# Patient Record
Sex: MALE | Race: WHITE | NOT HISPANIC OR LATINO | Employment: OTHER | URBAN - METROPOLITAN AREA
[De-identification: names, ages, dates, MRNs, and addresses within clinical notes are randomized per-mention and may not be internally consistent; named-entity substitution may affect disease eponyms.]

---

## 2017-01-12 ENCOUNTER — AMBULATORY - HEALTHEAST (OUTPATIENT)
Dept: INTERNAL MEDICINE | Facility: CLINIC | Age: 62
End: 2017-01-12

## 2017-01-12 DIAGNOSIS — Z82.49 FAMILY HISTORY OF MI (MYOCARDIAL INFARCTION): ICD-10-CM

## 2017-01-30 ENCOUNTER — AMBULATORY - HEALTHEAST (OUTPATIENT)
Dept: INTERNAL MEDICINE | Facility: CLINIC | Age: 62
End: 2017-01-30

## 2017-01-30 ENCOUNTER — HOSPITAL ENCOUNTER (OUTPATIENT)
Dept: CARDIOLOGY | Facility: CLINIC | Age: 62
Discharge: HOME | End: 2017-01-30
Attending: INTERNAL MEDICINE

## 2017-01-30 ENCOUNTER — OFFICE VISIT - HEALTHEAST (OUTPATIENT)
Dept: INTERNAL MEDICINE | Facility: CLINIC | Age: 62
End: 2017-01-30

## 2017-01-30 DIAGNOSIS — R94.39 ABNORMAL STRESS TEST: ICD-10-CM

## 2017-01-30 DIAGNOSIS — E11.9 TYPE 2 DIABETES MELLITUS (H): ICD-10-CM

## 2017-01-30 DIAGNOSIS — R79.89 OTHER SPECIFIED ABNORMAL FINDINGS OF BLOOD CHEMISTRY: ICD-10-CM

## 2017-01-30 DIAGNOSIS — Z82.49 FAMILY HISTORY OF MI (MYOCARDIAL INFARCTION): ICD-10-CM

## 2017-01-30 DIAGNOSIS — E78.00 PURE HYPERCHOLESTEROLEMIA: ICD-10-CM

## 2017-01-30 LAB
CV BLOOD PRESSURE: NORMAL MMHG
CV STRESS CURRENT BP HE: NORMAL
CV STRESS CURRENT HR HE: 115
CV STRESS CURRENT HR HE: 116
CV STRESS CURRENT HR HE: 119
CV STRESS CURRENT HR HE: 121
CV STRESS CURRENT HR HE: 121
CV STRESS CURRENT HR HE: 131
CV STRESS CURRENT HR HE: 133
CV STRESS CURRENT HR HE: 134
CV STRESS CURRENT HR HE: 138
CV STRESS CURRENT HR HE: 138
CV STRESS CURRENT HR HE: 140
CV STRESS CURRENT HR HE: 144
CV STRESS CURRENT HR HE: 145
CV STRESS CURRENT HR HE: 57
CV STRESS CURRENT HR HE: 66
CV STRESS CURRENT HR HE: 76
CV STRESS CURRENT HR HE: 77
CV STRESS CURRENT HR HE: 81
CV STRESS CURRENT HR HE: 83
CV STRESS CURRENT HR HE: 92
CV STRESS CURRENT HR HE: 93
CV STRESS CURRENT HR HE: 93
CV STRESS CURRENT HR HE: 95
CV STRESS DEVIATION TIME HE: NORMAL
CV STRESS EXERCISE STAGE HE: NORMAL
CV STRESS FINAL RESTING BP HE: NORMAL
CV STRESS FINAL RESTING HR HE: 81
CV STRESS MAX HR HE: 144
CV STRESS MAX TREADMILL GRADE HE: 14
CV STRESS MAX TREADMILL SPEED HE: 3.4
CV STRESS PEAK DIA BP HE: NORMAL
CV STRESS PEAK SYS BP HE: NORMAL
CV STRESS PHASE HE: NORMAL
CV STRESS PROTOCOL HE: NORMAL
CV STRESS RESTING PT POSITION HE: NORMAL
CV STRESS RESTING PT POSITION HE: NORMAL
CV STRESS ST DEVIATION AMOUNT HE: NORMAL
CV STRESS ST DEVIATION ELEVATION HE: NORMAL
CV STRESS ST EVELATION AMOUNT HE: NORMAL
CV STRESS TEST TYPE HE: NORMAL
CV STRESS TOTAL STAGE TIME MIN 1 HE: NORMAL
ECHO EJECTION FRACTION ESTIMATED: 65 %
HBA1C MFR BLD: 5.8 % (ref 3.5–6)
LEFT VENTRICLE HEART RATE: 72 BPM
STRESS ECHO BASELINE BP: NORMAL
STRESS ECHO BASELINE HR: 57
STRESS ECHO CALCULATED PERCENT HR: 91
STRESS ECHO LAST STRESS BP: NORMAL
STRESS ECHO LAST STRESS HR: 144
STRESS ECHO POST ESTIMATED WORKLOAD: 7.3
STRESS ECHO POST EXERCISE DUR MIN: 6
STRESS ECHO POST EXERCISE DUR SEC: 0
STRESS ECHO TARGET HR: 158

## 2017-01-31 ENCOUNTER — AMBULATORY - HEALTHEAST (OUTPATIENT)
Dept: INTERNAL MEDICINE | Facility: CLINIC | Age: 62
End: 2017-01-31

## 2017-01-31 DIAGNOSIS — N28.9 ACUTE RENAL INSUFFICIENCY: ICD-10-CM

## 2017-02-02 ENCOUNTER — COMMUNICATION - HEALTHEAST (OUTPATIENT)
Dept: CARDIOLOGY | Facility: CLINIC | Age: 62
End: 2017-02-02

## 2017-02-03 ENCOUNTER — COMMUNICATION - HEALTHEAST (OUTPATIENT)
Dept: INTERNAL MEDICINE | Facility: CLINIC | Age: 62
End: 2017-02-03

## 2017-02-03 ENCOUNTER — AMBULATORY - HEALTHEAST (OUTPATIENT)
Dept: LAB | Facility: CLINIC | Age: 62
End: 2017-02-03

## 2017-02-03 DIAGNOSIS — N28.9 ACUTE RENAL INSUFFICIENCY: ICD-10-CM

## 2017-02-06 ENCOUNTER — COMMUNICATION - HEALTHEAST (OUTPATIENT)
Dept: INTERNAL MEDICINE | Facility: CLINIC | Age: 62
End: 2017-02-06

## 2017-02-06 ENCOUNTER — OFFICE VISIT - HEALTHEAST (OUTPATIENT)
Dept: INTERNAL MEDICINE | Facility: CLINIC | Age: 62
End: 2017-02-06

## 2017-02-06 DIAGNOSIS — N28.9 ACUTE RENAL INSUFFICIENCY: ICD-10-CM

## 2017-02-06 DIAGNOSIS — E11.9 TYPE 2 DIABETES MELLITUS (H): ICD-10-CM

## 2017-02-07 ENCOUNTER — HOSPITAL ENCOUNTER (OUTPATIENT)
Dept: CT IMAGING | Facility: CLINIC | Age: 62
Discharge: HOME | End: 2017-02-07
Attending: INTERNAL MEDICINE

## 2017-02-08 ENCOUNTER — HOSPITAL ENCOUNTER (OUTPATIENT)
Dept: ULTRASOUND IMAGING | Facility: CLINIC | Age: 62
Discharge: HOME | End: 2017-02-08
Attending: INTERNAL MEDICINE

## 2017-02-08 DIAGNOSIS — N28.9 ACUTE RENAL INSUFFICIENCY: ICD-10-CM

## 2017-02-21 ENCOUNTER — COMMUNICATION - HEALTHEAST (OUTPATIENT)
Dept: ADMINISTRATIVE | Facility: CLINIC | Age: 62
End: 2017-02-21

## 2017-02-22 ENCOUNTER — AMBULATORY - HEALTHEAST (OUTPATIENT)
Dept: INTERNAL MEDICINE | Facility: CLINIC | Age: 62
End: 2017-02-22

## 2017-02-22 DIAGNOSIS — N28.9 ACUTE RENAL INSUFFICIENCY: ICD-10-CM

## 2017-02-27 ENCOUNTER — AMBULATORY - HEALTHEAST (OUTPATIENT)
Dept: LAB | Facility: CLINIC | Age: 62
End: 2017-02-27

## 2017-02-27 DIAGNOSIS — N28.9 ACUTE RENAL INSUFFICIENCY: ICD-10-CM

## 2017-03-02 ENCOUNTER — HOSPITAL ENCOUNTER (OUTPATIENT)
Dept: CT IMAGING | Facility: CLINIC | Age: 62
Discharge: HOME | End: 2017-03-02
Attending: INTERNAL MEDICINE

## 2017-03-16 ENCOUNTER — AMBULATORY - HEALTHEAST (OUTPATIENT)
Dept: LAB | Facility: CLINIC | Age: 62
End: 2017-03-16

## 2017-03-16 ENCOUNTER — AMBULATORY - HEALTHEAST (OUTPATIENT)
Dept: INTERNAL MEDICINE | Facility: CLINIC | Age: 62
End: 2017-03-16

## 2017-03-16 DIAGNOSIS — N28.9 MILD RENAL INSUFFICIENCY: ICD-10-CM

## 2017-03-20 ENCOUNTER — AMBULATORY - HEALTHEAST (OUTPATIENT)
Dept: INTERNAL MEDICINE | Facility: CLINIC | Age: 62
End: 2017-03-20

## 2017-03-20 DIAGNOSIS — N28.9 RENAL INSUFFICIENCY: ICD-10-CM

## 2017-03-30 ENCOUNTER — AMBULATORY - HEALTHEAST (OUTPATIENT)
Dept: INTERNAL MEDICINE | Facility: CLINIC | Age: 62
End: 2017-03-30

## 2017-03-30 ENCOUNTER — AMBULATORY - HEALTHEAST (OUTPATIENT)
Dept: LAB | Facility: CLINIC | Age: 62
End: 2017-03-30

## 2017-03-30 DIAGNOSIS — N28.9 RENAL INSUFFICIENCY: ICD-10-CM

## 2017-04-03 LAB
ALBUMIN PERCENT: 62.9 % (ref 51–67)
ALBUMIN SERPL ELPH-MCNC: 4.3 G/DL (ref 3.2–4.7)
ALPHA 1 PERCENT: 2.8 % (ref 2–4)
ALPHA 2 PERCENT: 13.1 % (ref 5–13)
ALPHA1 GLOB SERPL ELPH-MCNC: 0.2 G/DL (ref 0.1–0.3)
ALPHA2 GLOB SERPL ELPH-MCNC: 0.9 G/DL (ref 0.4–0.9)
B-GLOBULIN SERPL ELPH-MCNC: 0.9 G/DL (ref 0.7–1.2)
BETA PERCENT: 12.5 % (ref 10–17)
GAMMA GLOB SERPL ELPH-MCNC: 0.6 G/DL (ref 0.6–1.4)
GAMMA GLOBULIN PERCENT: 8.7 % (ref 9–20)
PATH ICD:: ABNORMAL
PROT PATTERN SERPL ELPH-IMP: ABNORMAL
PROT SERPL-MCNC: 6.9 G/DL (ref 6–8)
REVIEWING PATHOLOGIST: ABNORMAL

## 2017-04-04 LAB
PATH ICD:: NORMAL
PROT ELPH PNL UR ELPH: NORMAL
REVIEWING PATHOLOGIST: NORMAL

## 2017-04-05 ENCOUNTER — RECORDS - HEALTHEAST (OUTPATIENT)
Dept: ADMINISTRATIVE | Facility: OTHER | Age: 62
End: 2017-04-05

## 2017-04-06 ENCOUNTER — AMBULATORY - HEALTHEAST (OUTPATIENT)
Dept: LAB | Facility: CLINIC | Age: 62
End: 2017-04-06

## 2017-04-06 DIAGNOSIS — N28.9 RENAL INSUFFICIENCY: ICD-10-CM

## 2017-05-08 ENCOUNTER — SURGERY - HEALTHEAST (OUTPATIENT)
Dept: CARDIOLOGY | Facility: CLINIC | Age: 62
End: 2017-05-08

## 2017-05-08 ENCOUNTER — AMBULATORY - HEALTHEAST (OUTPATIENT)
Dept: CARDIOLOGY | Facility: CLINIC | Age: 62
End: 2017-05-08

## 2017-05-08 ENCOUNTER — OFFICE VISIT - HEALTHEAST (OUTPATIENT)
Dept: CARDIOLOGY | Facility: CLINIC | Age: 62
End: 2017-05-08

## 2017-05-08 DIAGNOSIS — R94.39 ABNORMAL STRESS ELECTROCARDIOGRAM TEST: ICD-10-CM

## 2017-05-08 DIAGNOSIS — R80.9 PROTEINURIA: ICD-10-CM

## 2017-05-08 DIAGNOSIS — E78.00 PURE HYPERCHOLESTEROLEMIA: ICD-10-CM

## 2017-05-08 ASSESSMENT — MIFFLIN-ST. JEOR: SCORE: 1704.33

## 2017-05-22 ENCOUNTER — SURGERY - HEALTHEAST (OUTPATIENT)
Dept: CARDIOLOGY | Facility: CLINIC | Age: 62
End: 2017-05-22

## 2017-05-22 ENCOUNTER — COMMUNICATION - HEALTHEAST (OUTPATIENT)
Dept: INTERNAL MEDICINE | Facility: CLINIC | Age: 62
End: 2017-05-22

## 2017-05-22 ASSESSMENT — MIFFLIN-ST. JEOR: SCORE: 1741.52

## 2017-05-25 ENCOUNTER — OFFICE VISIT - HEALTHEAST (OUTPATIENT)
Dept: INTERNAL MEDICINE | Facility: CLINIC | Age: 62
End: 2017-05-25

## 2017-05-25 DIAGNOSIS — E11.9 TYPE 2 DIABETES MELLITUS (H): ICD-10-CM

## 2017-05-25 DIAGNOSIS — R79.89 OTHER SPECIFIED ABNORMAL FINDINGS OF BLOOD CHEMISTRY: ICD-10-CM

## 2017-05-25 DIAGNOSIS — R63.5 WEIGHT GAIN, ABNORMAL: ICD-10-CM

## 2017-05-25 DIAGNOSIS — N18.9 CHRONIC RENAL INSUFFICIENCY: ICD-10-CM

## 2017-05-25 DIAGNOSIS — D64.9 ANEMIA: ICD-10-CM

## 2017-05-25 LAB — HBA1C MFR BLD: 6.5 % (ref 3.5–6)

## 2017-06-01 ENCOUNTER — AMBULATORY - HEALTHEAST (OUTPATIENT)
Dept: INTERNAL MEDICINE | Facility: CLINIC | Age: 62
End: 2017-06-01

## 2017-06-01 DIAGNOSIS — M79.604 RIGHT LEG PAIN: ICD-10-CM

## 2017-06-01 DIAGNOSIS — M54.10 RADICULAR LEG PAIN: ICD-10-CM

## 2017-06-03 ENCOUNTER — HOSPITAL ENCOUNTER (OUTPATIENT)
Dept: MRI IMAGING | Facility: CLINIC | Age: 62
Discharge: HOME | End: 2017-06-03
Attending: INTERNAL MEDICINE

## 2017-06-03 DIAGNOSIS — M54.10 RADICULAR LEG PAIN: ICD-10-CM

## 2017-06-03 DIAGNOSIS — M79.604 RIGHT LEG PAIN: ICD-10-CM

## 2017-06-08 ENCOUNTER — OFFICE VISIT - HEALTHEAST (OUTPATIENT)
Dept: INTERNAL MEDICINE | Facility: CLINIC | Age: 62
End: 2017-06-08

## 2017-06-08 DIAGNOSIS — M79.604 RIGHT LEG PAIN: ICD-10-CM

## 2017-07-17 ENCOUNTER — AMBULATORY - HEALTHEAST (OUTPATIENT)
Dept: INTERNAL MEDICINE | Facility: CLINIC | Age: 62
End: 2017-07-17

## 2017-07-17 RX ORDER — SILDENAFIL CITRATE 20 MG/1
TABLET ORAL
Qty: 90 TABLET | Refills: 3 | Status: SHIPPED | OUTPATIENT
Start: 2017-07-17 | End: 2024-05-16

## 2017-08-14 ENCOUNTER — AMBULATORY - HEALTHEAST (OUTPATIENT)
Dept: INTERNAL MEDICINE | Facility: CLINIC | Age: 62
End: 2017-08-14

## 2017-09-07 ENCOUNTER — OFFICE VISIT - HEALTHEAST (OUTPATIENT)
Dept: INTERNAL MEDICINE | Facility: CLINIC | Age: 62
End: 2017-09-07

## 2017-09-07 ENCOUNTER — AMBULATORY - HEALTHEAST (OUTPATIENT)
Dept: INTERNAL MEDICINE | Facility: CLINIC | Age: 62
End: 2017-09-07

## 2017-09-07 DIAGNOSIS — M17.10 KNEE ARTHROPATHY: ICD-10-CM

## 2017-09-07 DIAGNOSIS — R79.89 OTHER SPECIFIED ABNORMAL FINDINGS OF BLOOD CHEMISTRY: ICD-10-CM

## 2017-09-07 DIAGNOSIS — E11.9 TYPE 2 DIABETES MELLITUS (H): ICD-10-CM

## 2017-09-07 DIAGNOSIS — D64.9 CHRONIC ANEMIA: ICD-10-CM

## 2017-09-07 DIAGNOSIS — N18.9 CHRONIC RENAL INSUFFICIENCY: ICD-10-CM

## 2017-09-07 LAB — HBA1C MFR BLD: 5.7 % (ref 3.5–6)

## 2017-09-21 ENCOUNTER — AMBULATORY - HEALTHEAST (OUTPATIENT)
Dept: INTERNAL MEDICINE | Facility: CLINIC | Age: 62
End: 2017-09-21

## 2017-10-17 ENCOUNTER — COMMUNICATION - HEALTHEAST (OUTPATIENT)
Dept: INTERNAL MEDICINE | Facility: CLINIC | Age: 62
End: 2017-10-17

## 2017-10-17 DIAGNOSIS — E11.9 TYPE 2 DIABETES MELLITUS (H): ICD-10-CM

## 2017-11-06 ENCOUNTER — COMMUNICATION - HEALTHEAST (OUTPATIENT)
Dept: INTERNAL MEDICINE | Facility: CLINIC | Age: 62
End: 2017-11-06

## 2017-11-29 ENCOUNTER — OFFICE VISIT - HEALTHEAST (OUTPATIENT)
Dept: INTERNAL MEDICINE | Facility: CLINIC | Age: 62
End: 2017-11-29

## 2017-11-29 DIAGNOSIS — D64.9 CHRONIC ANEMIA: ICD-10-CM

## 2017-11-29 DIAGNOSIS — N18.9 CHRONIC RENAL INSUFFICIENCY: ICD-10-CM

## 2017-11-29 DIAGNOSIS — M76.30 ITB SYNDROME: ICD-10-CM

## 2017-11-29 DIAGNOSIS — R79.89 OTHER SPECIFIED ABNORMAL FINDINGS OF BLOOD CHEMISTRY: ICD-10-CM

## 2017-11-29 DIAGNOSIS — E11.9 TYPE 2 DIABETES MELLITUS (H): ICD-10-CM

## 2017-11-29 LAB
CHOLEST SERPL-MCNC: 196 MG/DL
FASTING STATUS PATIENT QL REPORTED: ABNORMAL
HBA1C MFR BLD: 6 % (ref 3.5–6)
HDLC SERPL-MCNC: 49 MG/DL
LDLC SERPL CALC-MCNC: 100 MG/DL
TRIGL SERPL-MCNC: 234 MG/DL

## 2017-11-29 RX ORDER — HYDROMORPHONE HYDROCHLORIDE 2 MG/1
TABLET ORAL
Qty: 20 TABLET | Refills: 0 | Status: SHIPPED | OUTPATIENT
Start: 2017-11-29 | End: 2024-05-16

## 2017-11-29 RX ORDER — TRAMADOL HYDROCHLORIDE 50 MG/1
TABLET ORAL
Qty: 90 TABLET | Refills: 0 | Status: SHIPPED | OUTPATIENT
Start: 2017-11-29 | End: 2024-05-16

## 2017-11-30 ENCOUNTER — AMBULATORY - HEALTHEAST (OUTPATIENT)
Dept: INTERNAL MEDICINE | Facility: CLINIC | Age: 62
End: 2017-11-30

## 2017-11-30 DIAGNOSIS — D50.9 IDA (IRON DEFICIENCY ANEMIA): ICD-10-CM

## 2018-01-10 ENCOUNTER — RECORDS - HEALTHEAST (OUTPATIENT)
Dept: ADMINISTRATIVE | Facility: OTHER | Age: 63
End: 2018-01-10

## 2018-01-11 ENCOUNTER — RECORDS - HEALTHEAST (OUTPATIENT)
Dept: ADMINISTRATIVE | Facility: OTHER | Age: 63
End: 2018-01-11

## 2018-04-23 ENCOUNTER — COMMUNICATION - HEALTHEAST (OUTPATIENT)
Dept: ADMINISTRATIVE | Facility: CLINIC | Age: 63
End: 2018-04-23

## 2018-04-25 ENCOUNTER — COMMUNICATION - HEALTHEAST (OUTPATIENT)
Dept: INTERNAL MEDICINE | Facility: CLINIC | Age: 63
End: 2018-04-25

## 2018-04-25 DIAGNOSIS — E11.9 TYPE 2 DIABETES MELLITUS (H): ICD-10-CM

## 2018-04-25 RX ORDER — LIRAGLUTIDE 6 MG/ML
INJECTION SUBCUTANEOUS
Qty: 6 ML | Refills: 0 | Status: SHIPPED | OUTPATIENT
Start: 2018-04-25 | End: 2024-05-13

## 2018-05-09 ENCOUNTER — OFFICE VISIT - HEALTHEAST (OUTPATIENT)
Dept: INTERNAL MEDICINE | Facility: CLINIC | Age: 63
End: 2018-05-09

## 2018-05-09 DIAGNOSIS — E11.9 TYPE 2 DIABETES MELLITUS (H): ICD-10-CM

## 2018-05-09 DIAGNOSIS — N18.9 CHRONIC RENAL INSUFFICIENCY: ICD-10-CM

## 2018-05-09 DIAGNOSIS — D64.9 CHRONIC ANEMIA: ICD-10-CM

## 2018-05-09 DIAGNOSIS — M12.9 ARTHROPATHY: ICD-10-CM

## 2018-05-09 DIAGNOSIS — Z12.5 SPECIAL SCREENING FOR MALIGNANT NEOPLASM OF PROSTATE: ICD-10-CM

## 2018-05-09 DIAGNOSIS — Z01.818 PREOP EXAM FOR INTERNAL MEDICINE: ICD-10-CM

## 2018-05-09 LAB
ALBUMIN SERPL-MCNC: 3.7 G/DL (ref 3.5–5)
ALBUMIN UR-MCNC: NEGATIVE MG/DL
ALP SERPL-CCNC: 78 U/L (ref 45–120)
ALT SERPL W P-5'-P-CCNC: 27 U/L (ref 0–45)
ANION GAP SERPL CALCULATED.3IONS-SCNC: 11 MMOL/L (ref 5–18)
APPEARANCE UR: CLEAR
AST SERPL W P-5'-P-CCNC: 22 U/L (ref 0–40)
BILIRUB SERPL-MCNC: 0.6 MG/DL (ref 0–1)
BILIRUB UR QL STRIP: NEGATIVE
BUN SERPL-MCNC: 36 MG/DL (ref 8–22)
CALCIUM SERPL-MCNC: 9.6 MG/DL (ref 8.5–10.5)
CHLORIDE BLD-SCNC: 103 MMOL/L (ref 98–107)
CHOLEST SERPL-MCNC: 199 MG/DL
CO2 SERPL-SCNC: 26 MMOL/L (ref 22–31)
COLOR UR AUTO: YELLOW
CREAT SERPL-MCNC: 1.83 MG/DL (ref 0.7–1.3)
ERYTHROCYTE [DISTWIDTH] IN BLOOD BY AUTOMATED COUNT: 12.2 % (ref 11–14.5)
FASTING STATUS PATIENT QL REPORTED: YES
GFR SERPL CREATININE-BSD FRML MDRD: 38 ML/MIN/1.73M2
GLUCOSE BLD-MCNC: 128 MG/DL (ref 70–125)
GLUCOSE UR STRIP-MCNC: NEGATIVE MG/DL
HBA1C MFR BLD: 6.6 % (ref 3.5–6)
HCT VFR BLD AUTO: 37.2 % (ref 40–54)
HDLC SERPL-MCNC: 43 MG/DL
HGB BLD-MCNC: 12.8 G/DL (ref 14–18)
HGB UR QL STRIP: NEGATIVE
IRON SATN MFR SERPL: 32 % (ref 20–50)
IRON SERPL-MCNC: 107 UG/DL (ref 42–175)
KETONES UR STRIP-MCNC: NEGATIVE MG/DL
LDLC SERPL CALC-MCNC: 122 MG/DL
LEUKOCYTE ESTERASE UR QL STRIP: NEGATIVE
MCH RBC QN AUTO: 29.5 PG (ref 27–34)
MCHC RBC AUTO-ENTMCNC: 34.4 G/DL (ref 32–36)
MCV RBC AUTO: 86 FL (ref 80–100)
NITRATE UR QL: NEGATIVE
PH UR STRIP: 5 [PH] (ref 5–8)
PLATELET # BLD AUTO: 162 THOU/UL (ref 140–440)
PMV BLD AUTO: 7.7 FL (ref 7–10)
POTASSIUM BLD-SCNC: 3.9 MMOL/L (ref 3.5–5)
PROT SERPL-MCNC: 7.1 G/DL (ref 6–8)
PSA SERPL-MCNC: 0.9 NG/ML (ref 0–4.5)
RBC # BLD AUTO: 4.33 MILL/UL (ref 4.4–6.2)
SODIUM SERPL-SCNC: 140 MMOL/L (ref 136–145)
SP GR UR STRIP: 1.02 (ref 1–1.03)
TIBC SERPL-MCNC: 332 UG/DL (ref 313–563)
TRANSFERRIN SERPL-MCNC: 266 MG/DL (ref 212–360)
TRIGL SERPL-MCNC: 168 MG/DL
UROBILINOGEN UR STRIP-ACNC: NORMAL
WBC: 5.7 THOU/UL (ref 4–11)

## 2018-05-09 RX ORDER — ATORVASTATIN CALCIUM 80 MG/1
80 TABLET, FILM COATED ORAL AT BEDTIME
Qty: 30 TABLET | Refills: 11 | Status: SHIPPED | OUTPATIENT
Start: 2018-05-09 | End: 2024-05-13

## 2018-05-09 ASSESSMENT — MIFFLIN-ST. JEOR: SCORE: 1699.34

## 2018-05-10 LAB — B BURGDOR IGG+IGM SER QL: 0.09 INDEX VALUE

## 2018-05-23 ENCOUNTER — COMMUNICATION - HEALTHEAST (OUTPATIENT)
Dept: ADMINISTRATIVE | Facility: CLINIC | Age: 63
End: 2018-05-23

## 2018-05-24 ENCOUNTER — RECORDS - HEALTHEAST (OUTPATIENT)
Dept: ADMINISTRATIVE | Facility: OTHER | Age: 63
End: 2018-05-24

## 2018-07-09 ENCOUNTER — COMMUNICATION - HEALTHEAST (OUTPATIENT)
Dept: INTERNAL MEDICINE | Facility: CLINIC | Age: 63
End: 2018-07-09

## 2018-07-11 RX ORDER — INSULIN GLARGINE 100 [IU]/ML
INJECTION, SOLUTION SUBCUTANEOUS
Qty: 15 PEN | Refills: 0 | Status: SHIPPED | OUTPATIENT
Start: 2018-07-11

## 2018-07-11 RX ORDER — LIRAGLUTIDE 6 MG/ML
INJECTION SUBCUTANEOUS
Qty: 6 ML | Refills: 0 | Status: SHIPPED | OUTPATIENT
Start: 2018-07-11 | End: 2024-05-16

## 2018-07-30 ENCOUNTER — TRANSFERRED RECORDS (OUTPATIENT)
Dept: HEALTH INFORMATION MANAGEMENT | Facility: CLINIC | Age: 63
End: 2018-07-30

## 2018-08-13 ENCOUNTER — COMMUNICATION - HEALTHEAST (OUTPATIENT)
Dept: INTERNAL MEDICINE | Facility: CLINIC | Age: 63
End: 2018-08-13

## 2018-08-30 ENCOUNTER — RECORDS - HEALTHEAST (OUTPATIENT)
Dept: ADMINISTRATIVE | Facility: OTHER | Age: 63
End: 2018-08-30

## 2018-10-11 ENCOUNTER — COMMUNICATION - HEALTHEAST (OUTPATIENT)
Dept: INTERNAL MEDICINE | Facility: CLINIC | Age: 63
End: 2018-10-11

## 2018-10-11 ENCOUNTER — RECORDS - HEALTHEAST (OUTPATIENT)
Dept: ADMINISTRATIVE | Facility: OTHER | Age: 63
End: 2018-10-11

## 2018-10-12 ENCOUNTER — COMMUNICATION - HEALTHEAST (OUTPATIENT)
Dept: INTERNAL MEDICINE | Facility: CLINIC | Age: 63
End: 2018-10-12

## 2018-10-12 RX ORDER — PIOGLITAZONEHYDROCHLORIDE 30 MG/1
TABLET ORAL
Qty: 90 TABLET | Refills: 0 | Status: SHIPPED | OUTPATIENT
Start: 2018-10-12 | End: 2024-05-16

## 2018-10-12 RX ORDER — CHLORTHALIDONE 25 MG/1
TABLET ORAL
Qty: 45 TABLET | Refills: 0 | Status: SHIPPED | OUTPATIENT
Start: 2018-10-12 | End: 2024-05-13

## 2018-12-20 ENCOUNTER — COMMUNICATION - HEALTHEAST (OUTPATIENT)
Dept: INTERNAL MEDICINE | Facility: CLINIC | Age: 63
End: 2018-12-20

## 2018-12-20 DIAGNOSIS — E11.9 TYPE 2 DIABETES MELLITUS (H): ICD-10-CM

## 2018-12-24 RX ORDER — CHLORTHALIDONE 25 MG/1
TABLET ORAL
Qty: 45 TABLET | Refills: 0 | Status: SHIPPED | OUTPATIENT
Start: 2018-12-24 | End: 2024-05-16

## 2018-12-24 RX ORDER — TAMSULOSIN HYDROCHLORIDE 0.4 MG/1
CAPSULE ORAL
Qty: 90 CAPSULE | Refills: 0 | Status: SHIPPED | OUTPATIENT
Start: 2018-12-24

## 2019-02-12 ENCOUNTER — COMMUNICATION - HEALTHEAST (OUTPATIENT)
Dept: CARDIOLOGY | Facility: CLINIC | Age: 64
End: 2019-02-12

## 2019-02-12 ENCOUNTER — COMMUNICATION - HEALTHEAST (OUTPATIENT)
Dept: INTERNAL MEDICINE | Facility: CLINIC | Age: 64
End: 2019-02-12

## 2019-02-12 DIAGNOSIS — E11.9 TYPE 2 DIABETES MELLITUS (H): ICD-10-CM

## 2019-02-12 DIAGNOSIS — R94.39 ABNORMAL STRESS ELECTROCARDIOGRAM TEST: ICD-10-CM

## 2019-04-23 ENCOUNTER — COMMUNICATION - HEALTHEAST (OUTPATIENT)
Dept: INTERNAL MEDICINE | Facility: CLINIC | Age: 64
End: 2019-04-23

## 2019-04-24 ENCOUNTER — COMMUNICATION - HEALTHEAST (OUTPATIENT)
Dept: INTERNAL MEDICINE | Facility: CLINIC | Age: 64
End: 2019-04-24

## 2020-02-14 ENCOUNTER — RECORDS - HEALTHEAST (OUTPATIENT)
Dept: ADMINISTRATIVE | Facility: OTHER | Age: 65
End: 2020-02-14

## 2021-05-26 ENCOUNTER — RECORDS - HEALTHEAST (OUTPATIENT)
Dept: ADMINISTRATIVE | Facility: CLINIC | Age: 66
End: 2021-05-26

## 2021-05-28 ENCOUNTER — RECORDS - HEALTHEAST (OUTPATIENT)
Dept: ADMINISTRATIVE | Facility: CLINIC | Age: 66
End: 2021-05-28

## 2021-05-28 NOTE — TELEPHONE ENCOUNTER
Left voicemail for patient to return call to clinic. When patient returns call, please give them below message.    Patient has not been seen in one year. He will need to establish care with a new physician now that Dr. Mack is no longer her. Please help schedule. Offer other locations as well. We may be able to get him a bridged refill once scheduled.    Lani Teresa CMA ............... 10:18 AM, 04/30/19

## 2021-05-28 NOTE — TELEPHONE ENCOUNTER
Former patient of Martina & has not established care with another provider.  Please assign refill request to covering provider per Clinic standard process.      Refill Approved    Rx renewed per Medication Renewal Policy. Medication was last renewed on 12/24/18.    Hortencia Mason, [facility] Triage/Med Refill 4/25/2019     Requested Prescriptions   Pending Prescriptions Disp Refills     chlorthalidone (HYGROTEN) 25 MG tablet [Pharmacy Med Name: CHLORTHALIDONE 25MG TABLETS] 45 tablet 0     Sig: TAKE 1/2 TABLET(12.5 MG) BY MOUTH DAILY       Diuretics/Combination Diuretics Refill Protocol  Passed - 4/23/2019  9:10 AM        Passed - Visit with PCP or prescribing provider visit in past 12 months     Last office visit with prescriber/PCP: Visit date not found OR same dept: Visit date not found OR same specialty: 11/29/2017 Fahad Mack MD  Last physical: Visit date not found Last MTM visit: Visit date not found   Next visit within 3 mo: Visit date not found  Next physical within 3 mo: Visit date not found  Prescriber OR PCP: John Torres MD  Last diagnosis associated with med order: There are no diagnoses linked to this encounter.  If protocol passes may refill for 12 months if within 3 months of last provider visit (or a total of 15 months).             Passed - Serum Potassium in past 12 months      Lab Results   Component Value Date    Potassium 3.9 05/09/2018             Passed - Serum Sodium in past 12 months      Lab Results   Component Value Date    Sodium 140 05/09/2018             Passed - Blood pressure on file in past 12 months     BP Readings from Last 1 Encounters:   05/09/18 138/70             Passed - Serum Creatinine in past 12 months      Creatinine   Date Value Ref Range Status   05/09/2018 1.83 (H) 0.70 - 1.30 mg/dL Final             tamsulosin (FLOMAX) 0.4 mg cap [Pharmacy Med Name: TAMSULOSIN 0.4MG CAPSULES] 90 capsule 0     Sig: TAKE 1 CAPSULE BY MOUTH DAILY AT BEDTIME        Alfuzosin/Tamsulosin/Silodosin Refill Protocol  Passed - 4/23/2019  9:10 AM        Passed - PCP or prescribing provider visit in past 12 months       Last office visit with prescriber/PCP: Visit date not found OR same dept: Visit date not found OR same specialty: 11/29/2017 Fahad Mack MD  Last physical: Visit date not found Last MTM visit: Visit date not found   Next visit within 3 mo: Visit date not found  Next physical within 3 mo: Visit date not found  Prescriber OR PCP: John Torres MD  Last diagnosis associated with med order: There are no diagnoses linked to this encounter.  If protocol passes may refill for 12 months if within 3 months of last provider visit (or a total of 15 months).

## 2021-05-28 NOTE — TELEPHONE ENCOUNTER
Left voicemail for patient to return call to clinic. When patient returns call, please give them below message.    Patient has not been seen in one year and has not established care with someone new. He will need to schedule an appointment.  Lani Teresa CMA ............... 10:23 AM, 04/26/19

## 2021-05-28 NOTE — TELEPHONE ENCOUNTER
Spoke with patient and he stayed with Dr. Mack. Disregard refills.  Lani Teresa CMA ............... 10:36 AM, 05/06/19

## 2021-05-28 NOTE — TELEPHONE ENCOUNTER
Former patient of Martina & has not established care with another provider.  Please assign refill request to covering provider per Clinic standard process.      Refill Approved    Rx renewed per Medication Renewal Policy. Medication was last renewed on 12/24/18.    Hortencia Mason, [facility] Triage/Med Refill 4/26/2019     Requested Prescriptions   Pending Prescriptions Disp Refills     tamsulosin (FLOMAX) 0.4 mg cap [Pharmacy Med Name: TAMSULOSIN 0.4MG CAPSULES] 60 capsule 0     Sig: TAKE 1 CAPSULE BY MOUTH DAILY AT BEDTIME       Alfuzosin/Tamsulosin/Silodosin Refill Protocol  Passed - 4/24/2019  8:38 PM        Passed - PCP or prescribing provider visit in past 12 months       Last office visit with prescriber/PCP: 11/29/2017 Fahad Mack MD OR gianluca dept: Visit date not found OR same specialty: 11/29/2017 Fahad Mack MD  Last physical: 5/9/2018 Last MTM visit: Visit date not found   Next visit within 3 mo: Visit date not found  Next physical within 3 mo: Visit date not found  Prescriber OR PCP: Fahad Mack MD  Last diagnosis associated with med order: There are no diagnoses linked to this encounter.  If protocol passes may refill for 12 months if within 3 months of last provider visit (or a total of 15 months).             chlorthalidone (HYGROTEN) 25 MG tablet [Pharmacy Med Name: CHLORTHALIDONE 25MG TABLETS] 45 tablet 0     Sig: TAKE 1/2 TABLET(12.5 MG) BY MOUTH DAILY       Diuretics/Combination Diuretics Refill Protocol  Passed - 4/24/2019  8:38 PM        Passed - Visit with PCP or prescribing provider visit in past 12 months     Last office visit with prescriber/PCP: 11/29/2017 Fahad Mack MD OR gianluca dept: Visit date not found OR same specialty: 11/29/2017 Fahad Mack MD  Last physical: 5/9/2018 Last MTM visit: Visit date not found   Next visit within 3 mo: Visit date not found  Next physical within 3 mo: Visit date not found  Prescriber OR PCP: Fahad Mack  MD  Last diagnosis associated with med order: There are no diagnoses linked to this encounter.  If protocol passes may refill for 12 months if within 3 months of last provider visit (or a total of 15 months).             Passed - Serum Potassium in past 12 months      Lab Results   Component Value Date    Potassium 3.9 05/09/2018             Passed - Serum Sodium in past 12 months      Lab Results   Component Value Date    Sodium 140 05/09/2018             Passed - Blood pressure on file in past 12 months     BP Readings from Last 1 Encounters:   05/09/18 138/70             Passed - Serum Creatinine in past 12 months      Creatinine   Date Value Ref Range Status   05/09/2018 1.83 (H) 0.70 - 1.30 mg/dL Final

## 2021-05-28 NOTE — TELEPHONE ENCOUNTER
Left voicemail for patient to return call to clinic. When patient returns call, please give them below message.    Patient has not been seen in over one year. He will need to establish care with a new physician now that Dr. Mack is no longer her. Please help schedule. Offer other locations as well. We may be able to get him a bridged refill once scheduled.  Lani Teresa CMA ............... 10:40 AM, 04/30/19

## 2021-05-30 VITALS — BODY MASS INDEX: 33.59 KG/M2 | WEIGHT: 214 LBS | HEIGHT: 67 IN

## 2021-05-31 VITALS — HEIGHT: 67 IN | WEIGHT: 222.2 LBS | BODY MASS INDEX: 34.88 KG/M2

## 2021-05-31 VITALS — WEIGHT: 206.3 LBS | BODY MASS INDEX: 32.31 KG/M2

## 2021-06-01 VITALS — HEIGHT: 67 IN | BODY MASS INDEX: 33.42 KG/M2 | WEIGHT: 212.9 LBS

## 2021-06-08 NOTE — PROGRESS NOTES
Monty comes back in today at my request relating to his persistent renal insufficiency.  BUN/creatinine and creatinine are both up significantly compared to just 4 or 5 months ago as is the potassium.  He is not using NSAIDs, had any IV dye, previous significant renal insufficiency, significant hypoperfusion type injury, and the only new medication is pioglitazone which does not appear to have any renal toxicity.  He is on losartan has been for some time and renal function has been normal.  Urination has been fine although frequent.  Says he is staying well-hydrated.  No other new supplements, excessive alcohol, other change in diet, or other etiology for relatively acute versus subacute renal insufficiency.  No symptoms to suggest any problems.  Blood pressure has not been difficult to manage and he says he is taking his medications regularly.  He has had a small amount of microalbumin in the urine and that has not changed significantly over the past few years.    I text him over the weekend and had him stop his losartan, and cut the metformin in half.    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  No recent significant travel to would be concerning.    Habits alcohol he states his a few drinks once or twice per week.  No smoking.    Medications were reviewed and correct, previous labs going back the last couple of years with renal function were reviewed.    Physical exam-vital signs are stable    No ankle swelling or edema.    Rosacea type aggie complexion unchanged.    Abdomen nontender no HSM and no bruits heard    Heart sounds normal      Assessment and plan    1.  Acute significant renal insufficiency of uncertain etiology.  Although it would be odd, I wonder if he has developed some renal artery stenosis and the losartan is causing some acute renal insufficiency.  He has not taken in the last couple of days and I will check labs today and  order a renal artery ultrasound.  This should be obtained to evaluate for hydronephrosis as well.      2.  ECG abnormality on stress test with normal echo findings.  The suggestion was made to get a CT angiogram but we will hold off as the patient's renal function would not support the use of dye and would put him at more significant risk.    #3 diabetes sugars have remained good and this lower dose of metformin.  We'll continue this lower dose unless his renal function worsens more significantly.      4.  Hypertension-patient seems to be tolerating going off the Cozaar without significant elevation in blood pressure right now.

## 2021-06-08 NOTE — PROGRESS NOTES
Clinic Note    Assessment:     1. Type 2 diabetes mellitus  Glycosylated Hemoglobin A1c    JIC RED    Microalbumin, Random Urine    Comprehensive Metabolic Panel   2. Pure hypercholesterolemia  Comprehensive Metabolic Panel   3. Abnormal Liver Function Test  Comprehensive Metabolic Panel    Iron and Transferrin Iron Binding Capacity    Ferritin     Assessment and Plan:  1.  Diabetes well controlled and beautiful now on this current management.  Continue the same.  Repeat visit in 3-4 months and if still quite low consider decreasing or stopping the upper right.      2.  Well-controlled with current meds but with strong family history of heart disease in the face of appropriate statin treatment.  Patient is having stress test today and continue with appropriate diet statin and he should probably be on aspirin daily as well.    3.Abnormal LFT s - hopefully all under control, but will check labs today.      Patient Instructions   Eye exam -     No Follow-up on file.         Subjective:      Monty Mcintyre is a 61 y.o. male comes in for follow-up of his diabetes, hypertension, hyperlipidemia, previous abnormal LFTs,.  We have begun insulin blood sugars under good control without significant hypoglycemia.  He's been feeling well without symptoms prompts or concerns.  No questions today related to his use of medications or side effects.  No hypoglycemic episodes.    The following portions of the patient's history were reviewed and updated as appropriate: Labs, past medical history, allergies, habits, medications,  Social history-Brother had an MI.  Strong family history of diabetes and heart disease and bladder cancer and prostate cancer.    Review of Systems:    As above and essentially all negative.  Patient's been feeling well without problems or concerns.  Complete negative  History   Smoking Status     Former Smoker     Quit date: 8/1/2012   Smokeless Tobacco     Not on file         Objective:     Vitals:     "01/30/17 1146   BP: 122/64   Pulse: 72       Exam:  Patient looks well dressed and vital signs are great.    CV RRR S1-S2 normal no murmur or bruit    Lungs completely clear    Abdomen soft minimally obese which is similar masses    Extremities normal    Diabetic foot exam normal    Psych-normal affect    Patient Active Problem List   Diagnosis     Pure hypercholesterolemia     Type 2 diabetes mellitus     Abnormal Liver Function Test     Proteinuria     Current Outpatient Prescriptions on File Prior to Visit   Medication Sig Dispense Refill     atorvastatin (LIPITOR) 40 MG tablet Take 40 mg by mouth bedtime.       glimepiride (AMARYL) 2 MG tablet One tab twice per day with the two larger meals of the day 180 tablet 3     insulin detemir (LEVEMIR FLEXTOUCH) 100 unit/mL (3 mL) pen 20 units daily 1 adj dose pen 12     losartan (COZAAR) 50 MG tablet Take 1 tablet (50 mg total) by mouth daily. 90 tablet 3     metFORMIN (GLUCOPHAGE) 1000 MG tablet Take 1 tablet (1,000 mg total) by mouth 2 (two) times a day with meals. 180 tablet 3     sildenafil (VIAGRA) 100 MG tablet Take 1 tablet (100 mg total) by mouth as needed for erectile dysfunction. 20 tablet 6     [DISCONTINUED] fluticasone (FLOVENT HFA) 110 mcg/actuation inhaler Inhale 2 puffs 2 (two) times a day. 1 Inhaler 2     blood glucose test strips Use 1 each As Directed as needed (daily). Dispense brand per patient's insurance at pharmacy discretion- pt has Accu-chek brian 100 each 3     generic lancets Use 1 each As Directed as needed. Dispense brand per patient's insurance at pharmacy discretion.Patient has accu-chek brian 100 each 3     pen needle, diabetic (BD ULTRA-FINE BRIAN PEN NEEDLES) 32 gauge x 5/32\" Ndle Use one needle daily with your Levemir pen 90 each 3     [DISCONTINUED] atorvastatin (LIPITOR) 80 MG tablet TAKE 1 TABLET BY MOUTH DAILY AT BEDTIME 90 tablet 3     No current facility-administered medications on file prior to visit.          Fahad GLORIA" Martina    1/30/2017

## 2021-06-10 NOTE — PROGRESS NOTES
Monty comes back in for discussion of 3 separate issues.This is both HPI and A+P    Issue 1 is his renal function and rather rapidly occurring renal insufficiency which is stabilized.  He just had a coronary angiogram which ended up being clean with little plaque.  No worrisome areas no stents etc.  He had pretreatment with Mucomyst and IV fluids and will check a renal function panel today.  He will stay away from NSAIDs and use tramadol as needed pain which is worked better.  This is controlled things better but he has not used much yet.  I am continuing to be concerned with the rapidity with which this developed and the fact that now he has developed some anemia as well.  He has had no blood loss.  Colonoscopy is up-to-date and done just 1-2 years ago.  He has not iron deficient and therefore I do not believe he needs EGD.  Question is whether or not this is all related to his worsening renal function.  Again, this seems to rapid in my mind.  With combination of this and problem #2 there is a question about amyloidosis?  Is there in association.  His ferritin is been elevated for quite some time although that is improved.  He has had liver biopsy related to this and liver workup that has all been negative.  Iron levels have been normal and therefore this is not likely to be hemochromatosis.  No family history of this.    Issue #2-anemia.  He has had SPEP and immunofixation and renal ultrasound and evaluation for renal artery stenosis.  He has minimal proteinuria.  I find it hard to believe that this is related to renal insufficiency developing so quickly.  Makes me wonder about another potential inflammatory cause.  Him and have somebody else review the case and then may decide to have him see hematology as well.    Issue #3-his right knee has become exquisitely painful over the last 2-3 weeks.  It swells up at times.  It sounds like he has had a Baker's cyst.  It may have ruptured causing severe calf pain.  Pain  radiates up the leg into his hamstring.  It is worse when he is been standing on his feet for hours.  He does wear relatively good supportive shoes.  I did suggest that he get some gel cushioned things for when he is doing his work to try and decrease the stress of the knee.  He is being seen by orthopedics and is likely going to get a steroid injection.  Questions whether or not this is just evidence of rather severe DJD versus perhaps something like a meniscal tear that is caused his symptoms.    Issue #4-he has had multiple Basaglar  pens that have failed and unable to get insulin out of them.  We will try prior authorization for the basalglar failing.       Past medical history reviewed multiple previous labs and the most recent labs reviewed, A1c being done today as well as other lab work.  Angiography report, allergies, meds,    Social history-staying busy at work and that is when he is on his feet at work that his knee is the worst.  Has traveled to [place] and [place] recently    Family history-father had diabetes that progressed to kidney failure    Review of systems- he has gained 15 pounds over the past 6 months but states that his diet has been exactly the same.  His meds have obviously changed.  No history of thyroid issue.  No blood loss or bleeding.  No other supplements.  Nothing else to explain the renal function or ferritin.  His right knee has been terribly painful as mentioned above.    Physical exam-vital signs stable    Neck supple without adenopathy, excellent groin without nodes, asked    CV RRR S1-S2 normal no murmur or bruit    Lungs completely clear    Abdomen-soft nontender no HSM or masses.    Skin is normal color.    Affect normal.

## 2021-06-10 NOTE — PROGRESS NOTES
[facility] RN Pre-Procedure Education Note    Procedure: Coronary Angiogram, Possible Percutaneous Coronary Intervention  Date of Procedure: 5/22/17  Arrival time: 0630AM  Location: [facility]     Diagnosis: abnl stress echo  Cardiologist Ordering Procedure: Dr Mcpherson  Primary Cardiologist: Dr Mcpherson  PCP: Fahad Mack MD    H&P completed by: Dr Mcpherson 5/8  Previous Cath Report: none  Bypass grafts: No  Labs within 7 days: am of procedure  Renal Issues: Yes  Diabetic: Yes    Does patient have contrast/IV dye allergy: no      Patient Education  Explained indications/risks for diagnostic evaluation, including one or more of the following:  coronary angiogram  Explained indications/risks for therapeutic interventions, including one or more of the following: PTCA and stent.  These risks are in addition to baseline risks associated with a Diagnostic Evaluation.  Patient state understanding of procedure and risks and agrees to proceed    Additional education comment: Pt was given procedure letter and written education material. This information was reviewed with the patient. No further questions at this time.    Pre-procedure instructions  Patient instructed to be NPO after midnight.  Patient instructed to arrange for transportation home following procedure  No driving for 24 hours post procedure  Depending on the results of the test, provider may decide to keep patient overnight in the hospital for further evaluation.  Reviewed lifting restrictions    Pre-procedure medication instructions  medication instructions: hold meds/insulin am of procedure      Current Outpatient Prescriptions   Medication Sig Dispense Refill     atorvastatin (LIPITOR) 40 MG tablet Take 40 mg by mouth bedtime.       blood glucose test strips Use 1 each As Directed as needed (daily). Dispense brand per patient's insurance at pharmacy discretion- pt has Accu-chek brian 100 each 3     chlorthalidone (HYGROTEN) 25 MG  "tablet Take 0.5 tablets (12.5 mg total) by mouth daily. 45 tablet 3     generic lancets Use 1 each As Directed as needed. Dispense brand per patient's insurance at pharmacy discretion.Patient has accu-chek brian 100 each 3     glimepiride (AMARYL) 2 MG tablet One tab twice per day with the two larger meals of the day 180 tablet 3     insulin detemir (LEVEMIR FLEXTOUCH) 100 unit/mL (3 mL) pen 20 units daily 1 adj dose pen 12     pen needle, diabetic (BD ULTRA-FINE BRIAN PEN NEEDLES) 32 gauge x 5/32\" Ndle Use one needle daily with your Levemir pen 90 each 3     sildenafil (VIAGRA) 100 MG tablet Take 1 tablet (100 mg total) by mouth as needed for erectile dysfunction. 20 tablet 6     No current facility-administered medications for this visit.        No Known Allergies          Arnaldo Vitale RN    "

## 2021-06-11 NOTE — PROGRESS NOTES
Clinic Note    Assessment:     1. Right leg pain       Assessment and Plan:  It is interesting that I really cannot find anything on exam at this time.  For that reason, my suspicion is that this is a statin related muscle aching pain and some muscle weakness and atrophy that is developing.  He will stop the atorvastatin at this time and get back to me within the next 2-4 weeks about symptoms and resolution and then decide on further management of his lipids.  We have investigated knee related symptoms and back related symptoms with MRI and visit orthopedics.  There is no Baker's cyst there is no fluid in the knee and really his description of symptoms does not fit with knee arthropathy.  No piriformis symptoms pain or pressure causing symptoms.   There are no Patient Instructions on file for this visit.  No Follow-up on file.         Subjective:      Monty Mcintyre is a 62 y.o. male is in with persistence of this episodic severe ache and pain in the right posterior leg and the medial hamstring area and down into the calf.  This is worst after standing for extended number of hours and then getting into a car or sitting for an extended period of time he describes as being completely different than his left knee symptoms when he had knee arthropathy.  He did have a steroid injection in the right knee but it does not appear to have benefited him.  He had a recent MRI of his LS spine failing to show any significant nerve impingement or possible nerve impingement.  He has done straight leg raise which is negative.  He is on high-dose atorvastatin.  He thinks he has noticed some right calf muscle atrophy.    The following portions of the patient's history were reviewed and updated as appropriate: MRI findings as mentioned above, medical problems, habits, allergies, medications, discussion of orthopedic workup    Review of Systems:    Completely negative except as above  History   Smoking Status     Former Smoker     Quit  "date: 8/1/2012   Smokeless Tobacco     Not on file         Objective:     Vitals:    06/08/17 1102   BP: 124/64   Pulse: 63       Exam:  Vital signs are stable.    Right lower extremity the knee looks fine and there is no popliteal fossa bulge.  There is no significant tenderness in the calf musculature or in the hamstring.  Massage and palpated with palpation of the muscle did not re-create symptoms.  Pressure in the piriformis area failed to reproduce any symptoms either.    Gait is stable and without obvious limp at this time      Patient Active Problem List   Diagnosis     Pure hypercholesterolemia     Type 2 diabetes mellitus     Abnormal Liver Function Test     Chronic renal insufficiency     Current Outpatient Prescriptions on File Prior to Visit   Medication Sig Dispense Refill     aspirin 81 MG EC tablet Take 81 mg by mouth daily.       atorvastatin (LIPITOR) 80 MG tablet Take 1 tablet (80 mg total) by mouth at bedtime. 30 tablet 11     chlorthalidone (HYGROTEN) 25 MG tablet Take 0.5 tablets (12.5 mg total) by mouth daily. 45 tablet 3     glimepiride (AMARYL) 2 MG tablet One tab twice per day with the two larger meals of the day 180 tablet 3     insulin detemir (LEVEMIR FLEXTOUCH) 100 unit/mL (3 mL) pen 20 units daily 1 adj dose pen 12     metFORMIN (GLUCOPHAGE) 500 MG tablet Take 1,000 mg by mouth once daily.       pen needle, diabetic (BD ULTRA-FINE ISAAC PEN NEEDLES) 32 gauge x 5/32\" Ndle Use one needle daily with your Levemir pen 90 each 3     pioglitazone (ACTOS) 30 MG tablet Take 1 tablet (30 mg total) by mouth daily. 90 tablet 3     [DISCONTINUED] traMADol (ULTRAM) 50 mg tablet 1-2 tabs with 1 tylenol three times a day as needed for pain 60 tablet 0     No current facility-administered medications on file prior to visit.          Fahad Mack    6/8/2017         "

## 2021-06-12 NOTE — PROGRESS NOTES
Clinic Note    Assessment:     Assessment and Plan:  1. Type 2 diabetes mellitus  A1c was 5.7 today.  Clearly he does not need to be this low.  We will plan on stopping the metformin because of his renal function, stopping the glimepiride and starting Victoza which has some potential to also help slow any progression of diabetic nephropathy.  This is the real reason that were adding the Victoza.  He will start as mentioned below in the instructions.  I discussed the case with a diabetic educator has further input into his case.  - Glycosylated Hemoglobin A1c  - [facility] RED    2. Abnormal Liver Function Test  He is really been fine and stable for some time and will check again.  His ferritin is been elevated as evidence of this but that is improved recently as well with less alcohol intake.  This is been a complex issue for years.  I discussed with nephrology and there is no other obvious etiology or problem.  - Comprehensive Metabolic Panel    3. Chronic renal insufficiency  We will check lab work today and see how his renal function is.  Still is not a great explanation for why this happened so quickly.  It is been stable however.  - Comprehensive Metabolic Panel  - Hemoglobin    4. Chronic anemia  Labs have all been normal the exception of the hemoglobin.  The assumption at this point is that it is related to his renal insufficiency.  Will check a hemoglobin today.    5. Knee arthropathy  He is can have this replaced sometime this fall early winter.  He will plan to preop.  I looked at his iron levels which have been normal so there is nothing we can do in prepping him for surgery.  Pain control be an issue afterwards.  He did well with Dilaudid but the other meds caused him troubles.  He also is doing well with tramadol which might be an option instead.  I will discuss it with his orthopedist when the time comes.       Patient Instructions   [facility] eye for your diabetic eye exam    Stop glimipiride    Start Victoza  0.6 mg daily for one week, then 1.2 mg daily.  This has been shown in some patients and studies to help decrease progression of diabetes related kidney problems.      Watch for nausea as a side effect ( it slows stomach emptying and will make you feel more full more quickly)       No Follow-up on file.         Subjective:      Monty Mcintyre is a 62 y.o. male comes in for follow-up of his complicated multiple medical problems related to abnormal labs, renal insufficiency, diabetes, and knee arthropathy that is going to require replacement.  States he has had a couple of low sugars.  He is taking metformin thousand milligrams once per day(I thought we had stopped this and will be stopped today) basically are 20 units daily and pioglitazone 30 mg daily and glimepiride 2 mg once per day.  He switch the metformin to the evening and found he did not have any low sugars anymore.  He takes his insulin in the morning.  He is eating differently now with smaller meals more often.  No other follow-up with nephrology recently or other issues.  He is still having rather significant pain in his knee especially for longer shifts.  He is limping.  He is planning on having knee replacement sometime after November.  Tramadol helps with the pain.  Is not using any NSAIDs.    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  No decongestants or other use of meds like that that would cause this.  Sounds like BPH.  Endocrine as above CV negative pulmonary negative diet as mentioned above  History   Smoking  "Status     Former Smoker     Quit date: 8/1/2012   Smokeless Tobacco     Not on file         Objective:     Vitals:    09/07/17 1022   BP: 132/62   Weight: 206 lb 4.8 oz (93.6 kg)       Exam:  Vital signs stable patient looks well no acute distress.  He actually looks quite healthy.  CV RRR S1-S2 normal  Lungs-clear  Abdomen-soft nontender no HSM or masses  Groin and axilla without nodes  Extremities without edema and diabetic foot exam normal  Walks with a limp  Painful right knee  Psych-affect normal    Patient Active Problem List   Diagnosis     Pure hypercholesterolemia     Type 2 diabetes mellitus     Abnormal Liver Function Test     Chronic renal insufficiency     Chronic anemia     Current Outpatient Prescriptions   Medication Sig Dispense Refill     aspirin 81 MG EC tablet Take 81 mg by mouth daily.       atorvastatin (LIPITOR) 80 MG tablet Take 1 tablet (80 mg total) by mouth at bedtime. 30 tablet 11     BASAGLAR KWIKPEN 100 unit/mL (3 mL) pen Inject 20 Units under the skin daily.  3     chlorthalidone (HYGROTEN) 25 MG tablet Take 0.5 tablets (12.5 mg total) by mouth daily. 45 tablet 3     pen needle, diabetic (BD ULTRA-FINE ISAAC PEN NEEDLES) 32 gauge x 5/32\" Ndle Use one needle daily with your Levemir pen 90 each 3     pioglitazone (ACTOS) 30 MG tablet Take 1 tablet (30 mg total) by mouth daily. 90 tablet 3     sildenafil (REVATIO) 20 mg tablet 1-5 tabs daily as needed 90 tablet 3     traMADol (ULTRAM) 50 mg tablet 1-2 tabs with 1 tylenol three times a day as needed for pain 90 tablet 0     liraglutide (VICTOZA) 0.6 mg/0.1 mL (18 mg/3 mL) PnIj injection Inject 0.2 mL (1.2 mg total) under the skin daily. 6 mL 3     No current facility-administered medications for this visit.          Fahad Mack    9/7/2017         "

## 2021-06-14 NOTE — PROGRESS NOTES
"Clinic Note    Assessment:     Assessment and Plan:  1. Type 2 diabetes mellitus  We will check A1c again today make sure that is all set and ready for his knee replacement coming up in January.  His A1c is responded beautifully to our current group of medicines.  Continue same.  - pen needle, diabetic (BD ULTRA-FINE ISAAC PEN NEEDLES) 32 gauge x 5/32\" Ndle; USE DAILY AS DIRECTED  Dispense: 100 each; Refill: 3  - Glycosylated Hemoglobin A1c  - Lipid Cascade    2. Chronic renal insufficiency  He has seen nephrology and we believe that it is related to diabetes and hypertension.  These are under better control at this time and will continue to monitor carefully.  - Comprehensive Metabolic Panel  - HM2(CBC w/o Differential)    3. Chronic anemia  We believe that this is relating to the renal insufficiency.  Will check lab work today to make certain that there is nothing that has changed or unusual or needs to be worked up prior to his surgery in January.  - HM2(CBC w/o Differential)  - Iron and Transferrin Iron Binding Capacity  - Vitamin B12    4. Abnormal Liver Function Test  My belief is that this was related to fatty infiltration related to excess calories and alcohol.  He has both eating and drinking much less these days and we will see what things look like at this point.  He has been worked up for this in the past including liver biopsy which is failed to show anything but fatty infiltration.  - Comprehensive Metabolic Panel  - Ferritin       Patient Instructions   Due for a physical exam at the preop in January     Try the dilaudid one tab at bedtime with tylenol or tylenol PM, but do NOT use the tramadol at the same time.       PT Kely to help with IT band     YouTube for ITB massage, stretch, and foam roller     Return in about 6 weeks (around 1/10/2018) for preop and physical .         Subjective:      Monty Mcintyre is a 62 y.o. male here for follow-up of his multiple medical problems listed above.  He has " been feeling well except for this terrible knee pain.  He got an injection but had relief for only a couple days.  He is needing to take tramadol 2 tabs at night and still occasionally wakes up.  He had no benefit from Percocet and Vicodin previously but Dilaudid did work postoperatively.  He is eating and drinking less.  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  Plans to have his knee replaced in January.    The following portions of the patient's history were reviewed and updated as appropriate: Past medical history, allergies, medicines, lab work over the past year with normal renal function, mild to moderate renal insufficiency findings and mild anemia    Review of Systems:    Completely negative with the exception of the knee and right lateral leg causing symptoms.  Symptoms are his IT band area.  History   Smoking Status     Former Smoker     Quit date: 8/1/2012   Smokeless Tobacco     Not on file         Objective:     Vitals:    11/29/17 1001   BP: 122/64   Pulse: 63       Exam:  Vital signs stable patient looks well no acute distress   CV-RRR S1-S2 normal no murmur    Lungs clear  Back no CVA tenderness  Abdomen benign no HSM or masses  Extremities without edema  Diabetic foot exam normal  Psych-affect normal  Extremities- right lateral thigh near the IT band is quite tender to massage palpation.  Right piriformis area also sore.    Patient Active Problem List   Diagnosis     Pure hypercholesterolemia     Type 2 diabetes mellitus     Abnormal Liver Function Test     Chronic renal insufficiency     Chronic anemia     Current Outpatient Prescriptions   Medication Sig Dispense Refill     aspirin 81 MG EC tablet Take 81 mg by mouth daily.       atorvastatin (LIPITOR) 80 MG tablet Take 1 tablet (80 mg total) by mouth at bedtime. 30 tablet 11     BASAGLAR KWIKPEN 100 unit/mL (3 mL) pen Inject 20 Units under the skin daily. 5 adj dose pen 3     chlorthalidone (HYGROTEN) 25 MG  "tablet Take 0.5 tablets (12.5 mg total) by mouth daily. 45 tablet 3     liraglutide (VICTOZA) 0.6 mg/0.1 mL (18 mg/3 mL) PnIj injection Inject 0.2 mL (1.2 mg total) under the skin daily. 6 mL 3     pen needle, diabetic (BD ULTRA-FINE ISAAC PEN NEEDLES) 32 gauge x 5/32\" Ndle USE DAILY AS DIRECTED 100 each 3     sildenafil (REVATIO) 20 mg tablet 1-5 tabs daily as needed 90 tablet 3     traMADol (ULTRAM) 50 mg tablet 1-2 tabs with 1 tylenol three times a day as needed for pain 90 tablet 0     HYDROmorphone (DILAUDID) 2 MG tablet One at bedtime as needed 20 tablet 0     pioglitazone (ACTOS) 30 MG tablet Take 1 tablet (30 mg total) by mouth daily. 90 tablet 3     No current facility-administered medications for this visit.          Fahad Mack    11/29/2017         "

## 2021-06-15 PROBLEM — N18.9 CHRONIC RENAL INSUFFICIENCY: Status: ACTIVE | Noted: 2017-05-25

## 2021-06-16 PROBLEM — D64.9 CHRONIC ANEMIA: Status: ACTIVE | Noted: 2017-09-07

## 2021-06-17 NOTE — PROGRESS NOTES
"Preoperative Exam    Scheduled Procedure: Right Knee Replacment   Surgery Date:  TBD   Surgery Location: [facility]    Surgeon:  Dr. Serrano    Assessment/Plan:     1. Preop exam for internal medicine  No contraindication for the surgical procedure.  Please see the Katie Hickman from last year.  No symptoms.  He remains on meds.  Good DVT prophylaxis recommended.  Aspirin 325 twice daily postoperatively should be fine for 1 month    - Comprehensive Metabolic Panel  - HM2(CBC w/o Differential)  - Electrocardiogram Perform and Read    Coronary Angiogram 5/22/2017 Conclusion:     Exertional dyspnea and abnormal stress EKG in setting of severe exertional hypertension during stress test.    Minimal coronary atherosclerosis    LV EDP normal    Estimated blood loss was <20 ml.    2. Type 2 diabetes mellitus  We will check an A1c today and make sure that things are under reasonable control prior to the procedure.  He does have renal insufficiency relating to his diabetes.  This is mild to moderate.  He uses his to injectables in the morning.  He will use in the morning before surgery but not the day of surgery.  The Actos is the night before and should not cause any difficulties.  - pen needle, diabetic (BD ULTRA-FINE ISAAC PEN NEEDLE) 32 gauge x 5/32\" Ndle; USE DAILY AS DIRECTED  Dispense: 100 each; Refill: 3  - atorvastatin (LIPITOR) 80 MG tablet; Take 1 tablet (80 mg total) by mouth at bedtime.  Dispense: 30 tablet; Refill: 11  - Comprehensive Metabolic Panel  - Urinalysis  - Glycosylated Hemoglobin A1c  - Lipid Cascade    3. Chronic renal insufficiency  We will check lab work today.  He has been followed by nephrology as well.  - Comprehensive Metabolic Panel  - Urinalysis    4. Chronic anemia  This is been felt to be related to his chronic renal insufficiency.  His iron saturation was low last visit.  His ferritin is elevated related to inflammatory reasons.  This been worked up especially of the liver with liver biopsies etc. " been negative.  If his iron saturation is low, I will put him on an iron daily regimen to maximize his iron level prior to his surgery.  - HM2(CBC w/o Differential)  - Iron and Transferrin Iron Binding Capacity    5. Special screening for malignant neoplasm of prostate  Strong family history of prostate cancer in 3 of his brothers.  - PSA (Prostatic-Specific Antigen), Annual Screen    6.  Right third MCP synovitis.  We will try topical Voltaren gel to see if that works for him.  He should use oral NSAIDs because of his kidney function.  Will get a Lyme titer.      Surgical Procedure Risk: Low (reported cardiac risk generally < 1%)  Have you had prior anesthesia?: No  Have you or any family members had a previous anesthesia reaction:  No  Do you or any family members have a history of a clotting or bleeding disorder?: Unknown  Cardiac Risk Assessment: no increased risk for major cardiac complications    Patient approved for surgery with general or local anesthesia.    Functional Status: Independent  Patient plans to recover at home with family.     Subjective:      Monty Mcintyre is a 62 y.o. male who presents for a preoperative consultation. The exam is requested by Dr. Serrano in preparation for right total knee arthroplasty to be performed at a yet to be determined location on a yet to be determined date. Today s examination on 5/9/2018 is done to review the underlying surgical condition of osteoarthritis of the knee, clear for anesthesia, and review medical problems with appropriate changes in medications. He is s/p left total knee arthroplasty. He had some postoperative pain with that surgery but did well with Dilaudid. He is still in some pain, but his right knee has felt better now that he has not had to stand on it as much. He has not been taking tramadol as often.     Monty has tolerated previous surgeries well without bleeding or anesthesia difficulty.     Diabetes: His last hemoglobin A1c was 6.0% on  11/29/2017, and his blood sugars have been running in a good range lately. He is on Basaglar, Victoza, and pioglitazone.     Hypercholesterolemia: He had a coronary angiogram done last year that showed minimal coronary atherosclerosis. He continues to take max dose statin and a daily aspirin.     Hand Pain: He has not been able to make a fist with his right hand for the past month. There was not any injury to the hand that he can recall. There is discomfort and stiffness in his third MCP joint. He has not tried taking anything for this.     Health Maintenance: He is due for a PSA today.     ROS:   His transferrin saturation was slightly low the last time it was checked. He has never taken an iron supplement. He has not had any blood in the urine or stool. He denies chest pain, pressure, or tightness in the chest. He has not had any trouble with his breathing. He has not had an eye exam in a couple of years. His left knee currently feels fine.   All other systems reviewed and are negative, other than those listed in the HPI.    Pertinent History  Do you have difficulty breathing or chest pain after walking up a flight of stairs: No  History of obstructive sleep apnea: Yes: might have it  Steroid use in the last 6 months: No  Frequent Aspirin/NSAID use: Yes: baby   Prior Blood Transfusion: No  Prior Blood Transfusion Reaction: No  If for some reason prior to, during or after the procedure, if it is medically indicated, would you be willing to have a blood transfusion?:  There is no transfusion refusal.     PFSH:  He recently retired. He is going to [place] next week. He has a family history of prostate cancer.     Current Outpatient Prescriptions   Medication Sig Dispense Refill     aspirin 81 MG EC tablet Take 81 mg by mouth daily.       atorvastatin (LIPITOR) 80 MG tablet Take 1 tablet (80 mg total) by mouth at bedtime. 30 tablet 11     BASAGLAR KWIKPEN 100 unit/mL (3 mL) pen Inject 20 Units under the skin daily. 5  "adj dose pen 3     chlorthalidone (HYGROTEN) 25 MG tablet Take 0.5 tablets (12.5 mg total) by mouth daily. 45 tablet 3     pen needle, diabetic (BD ULTRA-FINE ISAAC PEN NEEDLE) 32 gauge x 5/32\" Ndle USE DAILY AS DIRECTED 100 each 3     pioglitazone (ACTOS) 30 MG tablet Take 1 tablet (30 mg total) by mouth daily. 90 tablet 3     sildenafil (REVATIO) 20 mg tablet 1-5 tabs daily as needed 90 tablet 3     VICTOZA 2-BOAZ 0.6 mg/0.1 mL (18 mg/3 mL) PnIj injection ADMINISTER 1.2 MG UNDER THE SKIN DAILY 6 mL 0     diclofenac sodium (VOLTAREN) 1 % Gel Apply 1 application topically 4 (four) times a day. 100 g 2     HYDROmorphone (DILAUDID) 2 MG tablet One at bedtime as needed 20 tablet 0     traMADol (ULTRAM) 50 mg tablet 1-2 tabs with 1 tylenol three times a day as needed for pain 90 tablet 0     No current facility-administered medications for this visit.         No Known Allergies    Patient Active Problem List   Diagnosis     Pure hypercholesterolemia     Type 2 diabetes mellitus     Abnormal Liver Function Test     Chronic renal insufficiency     Chronic anemia       Past Medical History:   Diagnosis Date     Chronic kidney disease      Diabetes mellitus      Diabetes mellitus, type II      Elevated liver enzymes      Family history of myocardial infarction      High cholesterol      Hyperlipidemia        Past Surgical History:   Procedure Laterality Date     CARDIAC CATHETERIZATION  05/22/2017     CARDIAC CATHETERIZATION N/A 5/22/2017    Procedure: Coronary Angiogram;  Surgeon: Paty Johnson MD;  Location: [facility] Cath Lab;  Service:      AK CATH PLMT L HRT & ARTS W/NJX & ANGIO IMG S&I Left 5/22/2017    Procedure: Left Heart Catheterization Without Left Ventriculogram;  Surgeon: Paty Johnson MD;  Location: [facility] Cath Lab;  Service: Cardiology     TOTAL KNEE ARTHROPLASTY Left 2/13/13       Social History     Social History     Marital status: Single     Spouse name: N/A     Number of children: N/A     Years " "of education: N/A     Occupational History     Not on file.     Social History Main Topics     Smoking status: Former Smoker     Quit date: 8/1/2012     Smokeless tobacco: Never Used     Alcohol use Yes     Drug use: No     Sexual activity: Not on file     Other Topics Concern     Not on file     Social History Narrative       Patient Care Team:  Fahad Mack MD as PCP - General  Avni Couch MD as Physician (Nephrology)        Objective:     Vitals:    05/09/18 0828   BP: 138/70   Pulse: 64   Resp: 12   Temp: 97.4  F (36.3  C)   SpO2: 97%   Weight: 212 lb 14.4 oz (96.6 kg)   Height: 5' 7\" (1.702 m)         Physical Exam:  General Appearance: Alert, cooperative, no distress, appears stated age.  HEENT: EOMI, fundi not observed, TMs normal, mouth and throat without lesions.  Neck: Supple without adenopathy or thyromegaly.  Back: No CVA tenderness or spinous process pain.  Lungs: Clear to auscultation bilaterally, good air movement.  Heart: Regular rate and rhythm, S1 and S2 normal, no murmur or bruit.  Abdomen: Soft, non-tender, no HSM or masses.   Genitourinary: Normal male, testes descended without masses or hernias.  Rectal exam:  Prostate normal size for age without nodules.   Musculoskeletal: No gross abnormalities.  Extremities: No CCE, pulses II/IV and symmetric,   Diabetic foot exam done  Skin: No worrisome lesions noted.  Lymph nodes: Cervical, supraclavicular, groin, and axillary nodes normal.  Neurologic: CNII-XII intact, strength V/V and symmetric, DTRs II/IV and symmetric, sensory grossly intact  Psychiatric:  He has a normal mood and affect.    Patient Instructions   We will check your iron levels again today. If they are low, we will have you start iron one tablet daily. If you become constipated from this, you can cut back to one tablet every other day.     Stay on aspirin up until the day of surgery.     Hold chlorthalidone the day of and day before surgery.     No medications the morning " of surgery. (pioglitazone the night before is okay)    Eye exams every year since you are a diabetic and to screen for glaucoma.     Voltaren gel four times per day and heat 15-20 minutes three times daily for the hand pain. If this does not help, injections might be an option.       EKG: Normal sinus rhythm Q in 3 large QRS voltage in lateral leads suspicious for LVH    Labs:  Labs pending at this time.  Results will be reviewed when available.    Immunization History   Administered Date(s) Administered     Hep A, historic 02/17/2004     Influenza, Seasonal, Inj PF IIV3 11/28/2012, 01/06/2014     Influenza, seasonal,quad inj 36+ mos 09/26/2016, 11/29/2017     Td,adult,historic,unspecified 01/01/2001     Tdap 08/30/2011     ZOSTER, LIVE 06/17/2015       ADDITIONAL HISTORY SUMMARIZED (2): Reviewed 5/22/2017 coronary angiogram conclusion - summarized above.   DECISION TO OBTAIN EXTRA INFORMATION (1): None.   RADIOLOGY TESTS (1): Reviewed 1/30/2017 echo stress - positive for inducible ischemia.   LABS (1): Labs from 11/29/2017 reviewed. Labs ordered.   MEDICINE TESTS (1): ECG ordered   INDEPENDENT REVIEW (2 each): ECG from today personally read.     The visit lasted a total of 21 minutes face to face with the patient. Over 50% of the time was spent counseling and educating the patient about his upcoming surgery.    ICory, am scribing for and in the presence of, Dr. Mack.    I, Dr. Mack, personally performed the services described in this documentation, as scribed by Cory Gonsalez in my presence, and it is both accurate and complete.    Dragon dictation was used for this note.  Speech recognition errors are a possibility.    Total data points: 7    Electronically signed by Fahad Mack MD 05/09/18 8:21 AM

## 2021-06-24 NOTE — TELEPHONE ENCOUNTER
"RN cannot approve Refill Request    RN can NOT refill this medication --> Pt overdue for OV (due q 6 months for diabetic prescriptions).  Former patient of Dr Fahad Mack & has not established care with another provider.  Please assign refill request to covering provider per Clinic standard process.     Cassie Bernard, [facility] Triage/Med Refill 2/14/2019    Requested Prescriptions   Pending Prescriptions Disp Refills     pen needle, diabetic (BD ULTRA-FINE ISAAC PEN NEEDLE) 32 gauge x 5/32\" Ndle [Pharmacy Med Name: B-D PEN NDL ISAAC [id number](5/32) GRN] 100 each 0     Sig: USE 1 EACH TO TEST TWICE DAILY AS DIRECTED    Diabetic Supplies Refill Protocol Failed - 2/12/2019 12:03 PM       Failed - Visit with PCP or prescribing provider visit in last 6 months    Last office visit with prescriber/PCP: Visit date not found OR same dept: Visit date not found OR same specialty: 11/29/2017 Fahad Mack MD  Last physical: Visit date not found Last MTM visit: Visit date not found   Next visit within 3 mo: Visit date not found  Next physical within 3 mo: Visit date not found  Prescriber OR PCP: John Torres MD  Last diagnosis associated with med order: 1. Type 2 diabetes mellitus (H)  - BD ULTRA-FINE ISAAC PEN NEEDLE 32 gauge x 5/32\" Ndle [Pharmacy Med Name: B-D PEN NDL ISAAC [id number](5/32) GRN]; USE 1 EACH TO TEST TWICE DAILY AS DIRECTED  Dispense: 100 each; Refill: 0    If protocol passes may refill for 12 months if within 3 months of last provider visit (or a total of 15 months).            Failed - A1C in last 6 months    Hemoglobin A1c   Date Value Ref Range Status   05/09/2018 6.6 (H) 3.5 - 6.0 % Final                  "

## 2021-06-25 NOTE — PROGRESS NOTES
Progress Notes by Saroj Mcpherson MD at 5/8/2017  7:50 AM     Author: Saroj Mcpherson MD Service: -- Author Type: Physician    Filed: 5/8/2017  8:31 AM Encounter Date: 5/8/2017 Status: Signed    : Saroj Mcpherson MD (Physician)           Click to link to [facility] HEART CARE NOTE    Thank you, Dr. Mack, for asking us to see Monty Mcintyre at the [facility].      Assessment/Recommendations   She with multiple risk factors for coronary artery disease including diabetes who has dyspnea on exertion, abnormal EKG with exercise and low exercise tolerance.  His echocardiogram did not show new regional wall motion normalities but his exercise time was limited and significantly diminish for his age.  Talked about continuing with risk factor modification is been coordinated by Dr. Shelton and he is doing a reasonably good job here.  He could  his exercise duration a bit but he does exercise 4-5 times each week.    We talked about the possibility of CT angiography although his kidney function is abnormal he is followed with a nephrologist and this would be a fairly high dose of contrast.    We talked about the possibility of coronary angiography in which case we could limit the contrast significantly.  I believe given his multiple risk factors, dyspnea on exertion, and abnormal ECG, and limited exercise tolerance on the treadmill that coronary angiography would be reasonable and IV expressed that opinion to him and he agrees to proceed.    We will hydrate him well prior to this his nephrologist is given him a prescription for Mucomyst to take prior to contrast.    I appreciate the opportunity be involved in his care and should you have any questions or concerns regarding my evaluation or recommendations, please do not hesitate to contact me.         History of Present Illness    Mr. Motny Mcintyre is a 61 y.o. male with known multiple risk factors for  coronary artery disease.  He has a positive family history with 2 brothers having coronary artery disease in his father having his first presentation of coronary artery disease at the age of 54.  His father had 2 separate bypass surgeries.  The patient is also diabetic, has been recently treated for hypertension, has hyperlipidemia.  He smoked from age [age] only.    Because of his multiple risk factors and some shortness of breath he underwent a stress echocardiogram.  He was able to go for 6 minutes on a Malik protocol and stopped because of fatigue and significant dyspnea.  His EKG changed significantly.  His echocardiogram showed normal left ventricular systolic function at rest and improved systolic function after exercise without new regional wall motion normalities.    He denies orthopnea, paroxysmal nocturnal dyspnea, peripheral edema, syncope or near syncopal episodes.  He has no history of rheumatic fever, heart murmur, cerebrovascular accident or TIA.    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  He makes frequent trips to [place] and will be going there this afternoon.    He is a  and has 2 daughters and one grandchild.  [place] [facility].         Physical Examination Review of Systems   Vitals:    05/08/17 0744   BP: 134/72   Pulse: 60   Resp: 18     Body mass index is 33.52 kg/(m^2).  Wt Readings from Last 3 Encounters:   05/08/17 214 lb (97.1 kg)   09/26/16 200 lb 11.2 oz (91 kg)   06/03/16 200 lb 12.8 oz (91.1 kg)     General Appearance:   Alert, cooperative and in no acute distress.   ENT/Mouth: Oral mucuos membranes pink and moist .      EYES:  No scleral icterus. No Xanthelasma.    Neck: JVP normal. No Hepatojugular reflux. Thyroid not visualizedly   Chest/Lungs:   Lungs are clear to auscultation, equal chest wall expansion    Cardiovascular:   S1, S2 with 1/6 systolic murmur , no clicks or rubs.  Brachial, radial and posterior tibial pulses are intact and symetric. No carotid bruits noted   Abdomen:  Nontender. BS+. No bruits.      Extremities: No cyanosis, clubbing or edema   Skin: no xanthelasma, warm.    Psych: Appropriate affect.   Neurologic: normal gait, normal  bilateral, no tremors        General: WNL  Eyes: WNL  Ears/Nose/Throat: WNL  Lungs: WNL  Heart: WNL  Stomach: WNL  Bladder: Frequent Urination at Night  Muscle/Joints: Joint Pain  Skin: WNL  Nervous System: WNL  Mental Health: WNL     Blood: WNL     Medical History  Surgical History Family History Social History   Past Medical History:   Diagnosis Date   ? Diabetes mellitus    ? Diabetes mellitus, type II    ? Elevated liver enzymes    ? Family history of myocardial infarction    ? High cholesterol    ? Hyperlipidemia     Past Surgical History:   Procedure Laterality Date   ? TOTAL KNEE ARTHROPLASTY Left 2/13/13    Family History   Problem Relation Age of Onset   ? Coronary artery disease Other    ? Prostate cancer Other    ? Diabetes Other    ? Cancer Brother    ? Heart failure Brother    ? Multiple sclerosis Sister    ? Heart attack Father     Social History     Social History   ? Marital status: Single     Spouse name: N/A   ? Number of children: N/A   ? Years of education: N/A     Occupational History   ? Not on file.     Social History Main Topics   ? Smoking status: Former Smoker     Quit date: 8/1/2012   ? Smokeless tobacco: Not on file   ? Alcohol use Not on file   ? Drug use: Not on file   ? Sexual activity: Not on file     Other Topics Concern   ? Not on file     Social History Narrative          Medications  Allergies   Current Outpatient Prescriptions   Medication Sig Dispense Refill   ? atorvastatin (LIPITOR) 40 MG tablet Take 40 mg by mouth bedtime.     ? blood glucose test strips Use 1 each As Directed as needed (daily). Dispense brand per patient's insurance at pharmacy discretion- pt has Accu-chek brian 100 each 3   ?  "chlorthalidone (HYGROTEN) 25 MG tablet Take 0.5 tablets (12.5 mg total) by mouth daily. 45 tablet 3   ? generic lancets Use 1 each As Directed as needed. Dispense brand per patient's insurance at pharmacy discretion.Patient has accu-chek brian 100 each 3   ? glimepiride (AMARYL) 2 MG tablet One tab twice per day with the two larger meals of the day 180 tablet 3   ? insulin detemir (LEVEMIR FLEXTOUCH) 100 unit/mL (3 mL) pen 20 units daily 1 adj dose pen 12   ? pen needle, diabetic (BD ULTRA-FINE BRIAN PEN NEEDLES) 32 gauge x 5/32\" Ndle Use one needle daily with your Levemir pen 90 each 3   ? sildenafil (VIAGRA) 100 MG tablet Take 1 tablet (100 mg total) by mouth as needed for erectile dysfunction. 20 tablet 6     No current facility-administered medications for this visit.       No Known Allergies      Lab Results    Chemistry/lipid CBC Cardiac Enzymes/BNP/TSH/INR   Lab Results   Component Value Date    CHOL 192 06/03/2016    HDL 44 06/03/2016    LDLCALC 88 06/03/2016    TRIG 298 (H) 06/03/2016    CREATININE 1.95 (H) 03/30/2017    BUN 29 (H) 03/30/2017    K 4.6 03/30/2017     03/30/2017     (H) 03/30/2017    CO2 23 03/30/2017    Lab Results   Component Value Date    WBC 4.3 06/03/2016    HGB 13.9 (L) 06/03/2016    HCT 40.7 06/03/2016    MCV 89 06/03/2016     06/03/2016    Lab Results   Component Value Date    INR 1.46 (H) 04/21/2013                                                                   "

## 2021-06-26 ENCOUNTER — HEALTH MAINTENANCE LETTER (OUTPATIENT)
Age: 66
End: 2021-06-26

## 2021-10-16 ENCOUNTER — HEALTH MAINTENANCE LETTER (OUTPATIENT)
Age: 66
End: 2021-10-16

## 2022-02-05 ENCOUNTER — HEALTH MAINTENANCE LETTER (OUTPATIENT)
Age: 67
End: 2022-02-05

## 2022-06-20 ENCOUNTER — APPOINTMENT (OUTPATIENT)
Dept: RADIOLOGY | Facility: HOSPITAL | Age: 67
End: 2022-06-20
Attending: STUDENT IN AN ORGANIZED HEALTH CARE EDUCATION/TRAINING PROGRAM
Payer: MEDICARE

## 2022-06-20 ENCOUNTER — HOSPITAL ENCOUNTER (EMERGENCY)
Facility: HOSPITAL | Age: 67
Discharge: HOME | End: 2022-06-20
Attending: STUDENT IN AN ORGANIZED HEALTH CARE EDUCATION/TRAINING PROGRAM | Admitting: STUDENT IN AN ORGANIZED HEALTH CARE EDUCATION/TRAINING PROGRAM
Payer: MEDICARE

## 2022-06-20 VITALS
DIASTOLIC BLOOD PRESSURE: 64 MMHG | OXYGEN SATURATION: 96 % | RESPIRATION RATE: 15 BRPM | WEIGHT: 210 LBS | BODY MASS INDEX: 32.96 KG/M2 | TEMPERATURE: 97.6 F | HEIGHT: 67 IN | HEART RATE: 76 BPM | SYSTOLIC BLOOD PRESSURE: 128 MMHG

## 2022-06-20 DIAGNOSIS — I47.10 SVT (SUPRAVENTRICULAR TACHYCARDIA) (H): ICD-10-CM

## 2022-06-20 LAB
ALBUMIN SERPL-MCNC: 4 G/DL (ref 3.5–5)
ALP SERPL-CCNC: 72 U/L (ref 45–120)
ALT SERPL W P-5'-P-CCNC: 37 U/L (ref 0–45)
ANION GAP SERPL CALCULATED.3IONS-SCNC: 11 MMOL/L (ref 5–18)
AST SERPL W P-5'-P-CCNC: 24 U/L (ref 0–40)
BASOPHILS # BLD AUTO: 0 10E3/UL (ref 0–0.2)
BASOPHILS NFR BLD AUTO: 0 %
BILIRUB SERPL-MCNC: 0.5 MG/DL (ref 0–1)
BUN SERPL-MCNC: 42 MG/DL (ref 8–22)
CALCIUM SERPL-MCNC: 10.3 MG/DL (ref 8.5–10.5)
CHLORIDE BLD-SCNC: 113 MMOL/L (ref 98–107)
CO2 SERPL-SCNC: 15 MMOL/L (ref 22–31)
CREAT SERPL-MCNC: 2.87 MG/DL (ref 0.7–1.3)
EOSINOPHIL # BLD AUTO: 0.3 10E3/UL (ref 0–0.7)
EOSINOPHIL NFR BLD AUTO: 4 %
ERYTHROCYTE [DISTWIDTH] IN BLOOD BY AUTOMATED COUNT: 13.2 % (ref 10–15)
GFR SERPL CREATININE-BSD FRML MDRD: 23 ML/MIN/1.73M2
GLUCOSE BLD-MCNC: 113 MG/DL (ref 70–125)
HCT VFR BLD AUTO: 38.7 % (ref 40–53)
HGB BLD-MCNC: 12.7 G/DL (ref 13.3–17.7)
HOLD SPECIMEN: NORMAL
IMM GRANULOCYTES # BLD: 0 10E3/UL
IMM GRANULOCYTES NFR BLD: 1 %
LYMPHOCYTES # BLD AUTO: 2.3 10E3/UL (ref 0.8–5.3)
LYMPHOCYTES NFR BLD AUTO: 28 %
MAGNESIUM SERPL-MCNC: 1.8 MG/DL (ref 1.8–2.6)
MCH RBC QN AUTO: 28.4 PG (ref 26.5–33)
MCHC RBC AUTO-ENTMCNC: 32.8 G/DL (ref 31.5–36.5)
MCV RBC AUTO: 87 FL (ref 78–100)
MONOCYTES # BLD AUTO: 0.8 10E3/UL (ref 0–1.3)
MONOCYTES NFR BLD AUTO: 9 %
NEUTROPHILS # BLD AUTO: 5 10E3/UL (ref 1.6–8.3)
NEUTROPHILS NFR BLD AUTO: 58 %
NRBC # BLD AUTO: 0 10E3/UL
NRBC BLD AUTO-RTO: 0 /100
PLATELET # BLD AUTO: 223 10E3/UL (ref 150–450)
POTASSIUM BLD-SCNC: 4.5 MMOL/L (ref 3.5–5)
PROT SERPL-MCNC: 7.5 G/DL (ref 6–8)
RBC # BLD AUTO: 4.47 10E6/UL (ref 4.4–5.9)
SODIUM SERPL-SCNC: 139 MMOL/L (ref 136–145)
TROPONIN I SERPL-MCNC: 0.01 NG/ML (ref 0–0.29)
WBC # BLD AUTO: 8.4 10E3/UL (ref 4–11)

## 2022-06-20 PROCEDURE — 258N000003 HC RX IP 258 OP 636: Performed by: STUDENT IN AN ORGANIZED HEALTH CARE EDUCATION/TRAINING PROGRAM

## 2022-06-20 PROCEDURE — 272N000240 HC CARDIOVERT/DEFIB/PACER SUPP

## 2022-06-20 PROCEDURE — 80053 COMPREHEN METABOLIC PANEL: CPT | Performed by: STUDENT IN AN ORGANIZED HEALTH CARE EDUCATION/TRAINING PROGRAM

## 2022-06-20 PROCEDURE — 84484 ASSAY OF TROPONIN QUANT: CPT | Performed by: STUDENT IN AN ORGANIZED HEALTH CARE EDUCATION/TRAINING PROGRAM

## 2022-06-20 PROCEDURE — 96361 HYDRATE IV INFUSION ADD-ON: CPT

## 2022-06-20 PROCEDURE — 71045 X-RAY EXAM CHEST 1 VIEW: CPT

## 2022-06-20 PROCEDURE — 96360 HYDRATION IV INFUSION INIT: CPT

## 2022-06-20 PROCEDURE — 85025 COMPLETE CBC W/AUTO DIFF WBC: CPT | Performed by: STUDENT IN AN ORGANIZED HEALTH CARE EDUCATION/TRAINING PROGRAM

## 2022-06-20 PROCEDURE — 36415 COLL VENOUS BLD VENIPUNCTURE: CPT | Performed by: STUDENT IN AN ORGANIZED HEALTH CARE EDUCATION/TRAINING PROGRAM

## 2022-06-20 PROCEDURE — 250N000009 HC RX 250

## 2022-06-20 PROCEDURE — 99285 EMERGENCY DEPT VISIT HI MDM: CPT | Mod: 25

## 2022-06-20 PROCEDURE — 999N000157 HC STATISTIC RCP TIME EA 10 MIN

## 2022-06-20 PROCEDURE — 83735 ASSAY OF MAGNESIUM: CPT | Performed by: STUDENT IN AN ORGANIZED HEALTH CARE EDUCATION/TRAINING PROGRAM

## 2022-06-20 PROCEDURE — 92960 CARDIOVERSION ELECTRIC EXT: CPT

## 2022-06-20 PROCEDURE — 99152 MOD SED SAME PHYS/QHP 5/>YRS: CPT

## 2022-06-20 PROCEDURE — 93005 ELECTROCARDIOGRAM TRACING: CPT | Performed by: STUDENT IN AN ORGANIZED HEALTH CARE EDUCATION/TRAINING PROGRAM

## 2022-06-20 RX ORDER — ETOMIDATE 2 MG/ML
10 INJECTION INTRAVENOUS ONCE
Status: COMPLETED | OUTPATIENT
Start: 2022-06-20 | End: 2022-06-20

## 2022-06-20 RX ORDER — ETOMIDATE 2 MG/ML
INJECTION INTRAVENOUS
Status: COMPLETED
Start: 2022-06-20 | End: 2022-06-20

## 2022-06-20 RX ADMIN — SODIUM CHLORIDE, POTASSIUM CHLORIDE, SODIUM LACTATE AND CALCIUM CHLORIDE 1000 ML: 600; 310; 30; 20 INJECTION, SOLUTION INTRAVENOUS at 13:17

## 2022-06-20 RX ADMIN — ETOMIDATE 10 MG: 2 INJECTION INTRAVENOUS at 13:07

## 2022-06-20 NOTE — ED NOTES
Expected Patient Referral to ED  12:49 PM    Referring Clinic/Provider:  Dr. Bridges, cardiology.    Reason for referral/Clinical facts:  Chest pain and shortness of breath [place] golf [place] on [place] humid day.  Renal insufficiency with creatinine 2.2.  Known coronary artery disease history.  Suspicious for some sort of ACS.    Recommendations provided:  Patient being driven here by his brother.      Discussed that if direct admit is sought and any hurdles are encountered, this ED would be happy to see the patient and evaluate.    Informed caller that recommendations provided are recommendations based only on the facts provided and that they responsible to accept or reject the advice, or to seek a formal in person consultation as needed and that this ED will see/treat patient should they arrive.      Alexander Ly MD  [facility] EMERGENCY DEPARTMENT  [address] [place] [id number]  [phone]       Alexander Ly MD  06/20/22 4360

## 2022-06-20 NOTE — PROGRESS NOTES
Assisted with monitoring oxygen, ventilation and airway during conscious sedation for cardioversion.  PT was on room air throughout the procedure with SpO2 100% He maintained his airway.  Suction, ambu and emergency airways were available and on stand-by.  PT required a jaw-thrust after cardioversion and required nasal cannula oxygen, but woke up and was able to maintain his airway and oxygenate adequately.    Jose Angel Haywood, RT  6/20/2022

## 2022-06-20 NOTE — DISCHARGE INSTRUCTIONS
Your kidney function test was elevated today.  This could be from dehydration however I would like you to have a repeat blood test for your kidney function in about a week or so.  Make sure you are pushing oral hydration at home.  Since you did have a cardioversion today, I would not be surprised if you had some soreness in your chest over the next 24 hours.    Return for any palpitations, worsening chest pain, trouble breathing or any other concerning symptoms.

## 2022-06-20 NOTE — ED NOTES
Patient talkative, A & O x 4, denies SOB, denies pain. Skin pink, warm. Patient denies dizziness, no acute distress noted, no diaphoresis noted. Vitals WNL.

## 2022-06-20 NOTE — ED TRIAGE NOTES
Patient came in the emergency room by car with brother. Patient stated was [place] the [place] and started feeling short of breath then weak and a little chest pain then couldn't breathe.

## 2022-06-20 NOTE — ED NOTES
Patient states having no pain, no SOB, O2 3 lt/min NC taken off -O2 sats 100%. Lungs clear throughout all lobes bilaterally. Hearts sounds regular. No acute distress noted. Patient in bed, with brother at bedside.

## 2022-06-20 NOTE — ED PROVIDER NOTES
Emergency Department Encounter       FINAL IMPRESSION:  SVT.      ED COURSE AND MEDICAL DECISION MAKING       ED Course as of 07/12/22 1457   Mon Jun 20, 2022   1332 Patient is an obese diabetic hypertensive 67-year-old male here with sudden onset of shortness of breath chest discomfort and palpitations.  Patient states he was playing golf this morning when it hit him all of a sudden.  States he has never felt this before.  Was brought here immediately.  Proximately 15 minutes prior to arrival started.  On arrival here patient looks unwell.  Diaphoretic.  Pale.  Tachypneic.  Stating he is having chest pain.  Concern for decompensation.  EKG briefly EKG done.  Mildly wide QRS.  No STEMI.  Plan for cardioversion due to the fact the patient looks unwell, and concern for decompensation from patient's malignant arrhythmia.  Patient was given etomidate and cardioverted with synchronized rhythm successfully.     -Patient tolerated procedure well.  - Labs normal.  - Patient asymptomatic on discharge.  Recommend close outpatient follow-up.  - Discussed his SAMANTHA.  Plan for outpatient close follow-up.    1:01 PM I met with the patient, obtained history, performed an initial exam, and discussed options and plan for diagnostics and treatment here in the ED. Performed cardioversion.  2:17 PM Reevaluated patient and updated on results. Discussed plan for discharge - patient agreeable.      EKG  EKG showing a somewhat wide appearing irregular complex tachycardia with some depressions.  Concerning for either atypical SVT versus slow/atypical V. tach.  WPW is also on differential.    At the conclusion of the encounter I discussed the results of all the tests and the disposition. The questions were answered. The patient or family acknowledged understanding and was agreeable with the care plan.             Critical Care     Performed by: Kei Leiva or    Authorized by: Kei Leiva  Total critical care time: 30 minutes  Critical care was  necessary to treat or prevent imminent or life-threatening deterioration of the following conditions: SVT  Critical care was time spent personally by me on the following activities: development of treatment plan with patient or surrogate, discussions with consultants, examination of patient, evaluation of patient's response to treatment, obtaining history from patient or surrogate, ordering and performing treatments and interventions, ordering and review of laboratory studies, ordering and review of radiographic studies, re-evaluation of patient's condition and monitoring for potential decompensation.  Critical care time was exclusive of separately billable procedures and treating other patients.        MEDICATIONS GIVEN IN THE EMERGENCY DEPARTMENT:  Medications   etomidate (AMIDATE) injection 10 mg (10 mg Intravenous Given 6/20/22 1307)   lactated ringers BOLUS 1,000 mL (1,000 mLs Intravenous New Bag 6/20/22 1317)       NEW PRESCRIPTIONS STARTED AT TODAY'S ED VISIT:  New Prescriptions    No medications on file       HPI     Patient information obtained from: Patient    Use of Interpretor: N/A    Monty Mcintyre is a 67 year old male with a pertinent history of type II DM, hypercholesterolemia, chronic renal insufficiency, who presents to this ED by private car with brother for evaluation of heart palpitations, shortness of breath. Patient reports sudden onset of heart palpitations and shortness of breath around 15-20 minutes prior to arrival while playing golf with his brother in the hot weather (~95 F outside). Endorses a mild associated chest pain. Describes the palpitations as feeling like his heart rate suddenly skyrocketed. Brought in by his brother from the golf course and is diaphoretic. He has never felt this before. He reports a familial cardiac history. States that he had a clear coronary angiogram in 2017. Denies any fevers or any other concerns.      REVIEW OF SYSTEMS:  Review of Systems  "  Constitutional: Negative for fever, malaise. Positive for diaphoresis.  HEENT: Negative runny nose, sore throat, ear pain, neck pain  Respiratory: Negative for cough, congestion Positive for shortness of breath.  Cardiovascular: Negative for leg edema. Positive for chest pain, palpitations  Gastrointestinal: Negative for abdominal distention, abdominal pain, constipation, vomiting, nausea, diarrhea  Genitourinary: Negative for dysuria and hematuria.   Integument: Negative for rash, skin breakdown  Neurological: Negative for paresthesias, weakness, headache.  Musculoskeletal: Negative for joint pain, joint swelling      All other systems reviewed and are negative.      MEDICAL HISTORY     History reviewed. No pertinent past medical history.    Past Surgical History:   Procedure Laterality Date     CARDIAC CATHETERIZATION  2017     CARDIAC CATHETERIZATION N/A 2017    Procedure: Coronary Angiogram;  Surgeon: Paty Johnson MD;  Location: [facility] Cath Lab;  Service:      UT CATH PLMT L HRT & ARTS W/NJX & ANGIO IMG S&I Left 2017    Procedure: Left Heart Catheterization Without Left Ventriculogram;  Surgeon: Paty Johnson MD;  Location: [facility] Cath Lab;  Service: Cardiology     TOTAL KNEE ARTHROPLASTY Left 13       Social History     Tobacco Use     Smoking status: Former Smoker     Quit date: 2012     Years since quittin.8     Smokeless tobacco: Never Used   Substance Use Topics     Alcohol use: Yes     Drug use: No       aspirin 81 MG EC tablet  atorvastatin (LIPITOR) 80 MG tablet  BASAGLAR KWIKPEN U-100 INSULIN 100 unit/mL (3 mL) pen  chlorthalidone (HYGROTEN) 25 MG tablet  chlorthalidone (HYGROTEN) 25 MG tablet  diclofenac sodium (VOLTAREN) 1 % Gel  HYDROmorphone (DILAUDID) 2 MG tablet  pen needle, diabetic (BD ULTRA-FINE ISAAC PEN NEEDLE) 32 gauge x \" Ndle  pioglitazone (ACTOS) 30 MG tablet  sildenafil (REVATIO) 20 mg tablet  tamsulosin (FLOMAX) 0.4 mg cap  traMADol " "(ULTRAM) 50 mg tablet  VICTOZA 2-BOAZ 0.6 mg/0.1 mL (18 mg/3 mL) PnIj injection  VICTOZA 2-BOAZ 0.6 mg/0.1 mL (18 mg/3 mL) PnIj injection      PHYSICAL EXAM     BP (!) 144/69   Pulse 74   Temp 97.6  F (36.4  C)   Resp 20   Ht 1.702 m (5' 7\")   Wt 95.3 kg (210 lb)   SpO2 99%   BMI 32.89 kg/m        PHYSICAL EXAM:   General: Patient appears unwell. Diaphoretic.   HEENT: Moist mucous membranes, no tongue swelling.  No head trauma.  No midline neck pain.  Cardiovascular: Normal rate, normal rhythm, no extremity edema.  No appreciable murmur.  Respiratory: Patient appears to be in moderate to severe respiratory distress., lungs are clear to auscultation bilaterally with no wheezes rhonchi or rales. Tachypneic.  Abdominal: Soft, nontender, nondistended, no palpable masses, no guarding, no rebound  Musculoskeletal: Full range of motion of joints, no deformities appreciated.  Neurological: Alert and oriented, grossly neurologically intact.  Psychological: Normal affect and mood.  Integument: No rashes appreciated. Pal.      RESULTS       Labs Ordered and Resulted from Time of ED Arrival to Time of ED Departure   COMPREHENSIVE METABOLIC PANEL - Abnormal       Result Value    Sodium 139      Potassium 4.5      Chloride 113 (*)     Carbon Dioxide (CO2) 15 (*)     Anion Gap 11      Urea Nitrogen 42 (*)     Creatinine 2.87 (*)     Calcium 10.3      Glucose 113      Alkaline Phosphatase 72      AST 24      ALT 37      Protein Total 7.5      Albumin 4.0      Bilirubin Total 0.5      GFR Estimate 23 (*)    CBC WITH PLATELETS AND DIFFERENTIAL - Abnormal    WBC Count 8.4      RBC Count 4.47      Hemoglobin 12.7 (*)     Hematocrit 38.7 (*)     MCV 87      MCH 28.4      MCHC 32.8      RDW 13.2      Platelet Count 223      % Neutrophils 58      % Lymphocytes 28      % Monocytes 9      % Eosinophils 4      % Basophils 0      % Immature Granulocytes 1      NRBCs per 100 WBC 0      Absolute Neutrophils 5.0      Absolute Lymphocytes " 2.3      Absolute Monocytes 0.8      Absolute Eosinophils 0.3      Absolute Basophils 0.0      Absolute Immature Granulocytes 0.0      Absolute NRBCs 0.0     MAGNESIUM - Normal    Magnesium 1.8     TROPONIN I - Normal    Troponin I 0.01         XR Chest Port 1 View   Final Result   IMPRESSION: Negative chest.      -Cardioversion External    (Results Pending)       PROCEDURES:     Procedures:  [facility]    -Cardioversion External    Date/Time: 6/20/2022 1:23 PM  Performed by: Kei Leiva DO  Authorized by: Kei Leiva DO     Risks, benefits and alternatives discussed.    ED EVALUATION:      I have performed an Emergency Department Evaluation including taking a history and physical examination, this evaluation will be documented in the electronic medical record for this ED encounter.      ASA Class: Class 2- mild systemic disease, no acute problems, no functional limitations    Mallampati: Grade 1- soft palate, uvula, tonsillar pillars, and posterior pharyngeal wall visible    NPO Status: appropriately NPO for procedure    UNIVERSAL PROTOCOL   Site Marked: NA  Prior Images Obtained and Reviewed:  NA  Required items: Required blood products, implants, devices and special equipment available    Patient identity confirmed:  Verbally with patient  Patient was reevaluated immediately before administering moderate or deep sedation or anesthesia  Confirmation Checklist:  Patient's identity using two indicators, relevant allergies, procedure was appropriate and matched the consent or emergent situation and correct equipment/implants were available  Time out: Immediately prior to the procedure a time out was called    Universal Protocol: the Joint Commission Universal Protocol was followed    Preparation: Patient was prepped and draped in usual sterile fashion      SEDATION  Patient Sedated: Yes    Sedation Type:  Moderate (conscious) sedation  Sedation:  Etomidate  Vital signs: Vital  signs monitored during sedation      PRE-PROCEDURE DETAILS:     Cardioversion basis:  Emergent    Electrode placement:  Anterior-posterior  Attempt one:     Cardioversion mode:  Synchronous    Waveform:  Biphasic    Shock (Joules):  200    Shock outcome:  Conversion to normal sinus rhythm  Post-procedure details:     Patient status:  Awake      PROCEDURE    Patient Tolerance:  Patient tolerated the procedure well with no immediate complications  Length of time physician/provider present for 1:1 monitoring during sedation: 10         I, Edgar Adams am serving as a scribe to document services personally performed by Kei Leiva DO, based on my observations and the provider's statements to me.  I, Kei Leiva DO, attest that Edgar Adams is acting in a scribe capacity, has observed my performance of the services and has documented them in accordance with my direction.    Kei Leiva DO  Emergency Medicine  [facility] EMERGENCY DEPARTMENT     Kei Leiva DO  06/21/22 1111       Kei Leiva DO  07/01/22 2156       OhKei woodall DO  07/12/22 1457       Kei Leiva DO  07/13/22 1558

## 2022-06-21 LAB
ATRIAL RATE - MUSE: 202 BPM
ATRIAL RATE - MUSE: 86 BPM
DIASTOLIC BLOOD PRESSURE - MUSE: 81 MMHG
DIASTOLIC BLOOD PRESSURE - MUSE: 89 MMHG
INTERPRETATION ECG - MUSE: NORMAL
INTERPRETATION ECG - MUSE: NORMAL
P AXIS - MUSE: 39 DEGREES
P AXIS - MUSE: NORMAL DEGREES
PR INTERVAL - MUSE: 158 MS
PR INTERVAL - MUSE: NORMAL MS
QRS DURATION - MUSE: 130 MS
QRS DURATION - MUSE: 90 MS
QT - MUSE: 274 MS
QT - MUSE: 304 MS
QTC - MUSE: 363 MS
QTC - MUSE: 471 MS
R AXIS - MUSE: 52 DEGREES
R AXIS - MUSE: 64 DEGREES
SYSTOLIC BLOOD PRESSURE - MUSE: 148 MMHG
SYSTOLIC BLOOD PRESSURE - MUSE: 160 MMHG
T AXIS - MUSE: -59 DEGREES
T AXIS - MUSE: 79 DEGREES
VENTRICULAR RATE- MUSE: 178 BPM
VENTRICULAR RATE- MUSE: 86 BPM

## 2022-07-06 ENCOUNTER — HOSPITAL ENCOUNTER (OUTPATIENT)
Dept: CARDIOLOGY | Facility: CLINIC | Age: 67
Discharge: HOME | End: 2022-07-06
Attending: INTERNAL MEDICINE | Admitting: INTERNAL MEDICINE
Payer: MEDICARE

## 2022-07-06 DIAGNOSIS — I25.10 CAD (CORONARY ARTERY DISEASE): ICD-10-CM

## 2022-07-06 PROCEDURE — C8928 TTE W OR W/O FOL W/CON,STRES: HCPCS

## 2022-07-06 PROCEDURE — 93350 STRESS TTE ONLY: CPT | Mod: 26 | Performed by: INTERNAL MEDICINE

## 2022-07-06 PROCEDURE — 93018 CV STRESS TEST I&R ONLY: CPT | Performed by: INTERNAL MEDICINE

## 2022-07-06 PROCEDURE — 93352 ADMIN ECG CONTRAST AGENT: CPT | Performed by: INTERNAL MEDICINE

## 2022-07-06 PROCEDURE — 93321 DOPPLER ECHO F-UP/LMTD STD: CPT | Mod: TC

## 2022-07-06 PROCEDURE — 93325 DOPPLER ECHO COLOR FLOW MAPG: CPT | Mod: 26 | Performed by: INTERNAL MEDICINE

## 2022-07-06 PROCEDURE — 93016 CV STRESS TEST SUPVJ ONLY: CPT | Performed by: INTERNAL MEDICINE

## 2022-07-06 PROCEDURE — 255N000002 HC RX 255 OP 636: Performed by: INTERNAL MEDICINE

## 2022-07-06 PROCEDURE — 93321 DOPPLER ECHO F-UP/LMTD STD: CPT | Mod: 26 | Performed by: INTERNAL MEDICINE

## 2022-07-06 RX ADMIN — PERFLUTREN 5 ML: 6.52 INJECTION, SUSPENSION INTRAVENOUS at 14:30

## 2022-07-08 ENCOUNTER — OFFICE VISIT (OUTPATIENT)
Dept: CARDIOLOGY | Facility: CLINIC | Age: 67
End: 2022-07-08
Payer: MEDICARE

## 2022-07-08 VITALS
DIASTOLIC BLOOD PRESSURE: 56 MMHG | SYSTOLIC BLOOD PRESSURE: 120 MMHG | RESPIRATION RATE: 16 BRPM | WEIGHT: 210 LBS | BODY MASS INDEX: 32.96 KG/M2 | HEART RATE: 72 BPM | HEIGHT: 67 IN

## 2022-07-08 DIAGNOSIS — I47.10 SVT (SUPRAVENTRICULAR TACHYCARDIA) (H): Primary | ICD-10-CM

## 2022-07-08 PROCEDURE — 99204 OFFICE O/P NEW MOD 45 MIN: CPT | Performed by: INTERNAL MEDICINE

## 2022-07-08 RX ORDER — ALLOPURINOL 100 MG/1
1 TABLET ORAL DAILY
COMMUNITY
Start: 2022-04-20

## 2022-07-08 RX ORDER — FLASH GLUCOSE SENSOR
KIT MISCELLANEOUS
COMMUNITY
Start: 2022-07-01

## 2022-07-08 RX ORDER — LOSARTAN POTASSIUM 25 MG/1
1 TABLET ORAL DAILY
COMMUNITY
Start: 2022-05-11

## 2022-07-08 RX ORDER — ROSUVASTATIN CALCIUM 40 MG/1
1 TABLET, COATED ORAL AT BEDTIME
COMMUNITY
Start: 2022-06-21

## 2022-07-08 RX ORDER — PROPRANOLOL HYDROCHLORIDE 10 MG/1
10 TABLET ORAL PRN
COMMUNITY
Start: 2022-06-21

## 2022-07-08 RX ORDER — SEMAGLUTIDE 1.34 MG/ML
0.25 INJECTION, SOLUTION SUBCUTANEOUS
COMMUNITY
Start: 2022-05-09

## 2022-07-08 RX ORDER — INSULIN ASPART 100 [IU]/ML
INJECTION, SOLUTION INTRAVENOUS; SUBCUTANEOUS
COMMUNITY

## 2022-07-08 NOTE — PROGRESS NOTES
[facility]  [phone]          Assessment/Recommendations   Patient with known history of minimal coronary artery disease plaquing in 2017 who recently presented with abrupt changes in his breathing, rapid heartbeat was noted to be BEAR SVT and cardioverted in the emergency room at [facility].  He has had some other minor episodes which could be related to SVT but not clearly as long-lasting or as dramatic.  He also had some chest discomfort and it is comforting that his stress echocardiogram was checked and is unremarkable.    We talked about the statistical likelihood that this is AV roxann reentry tachycardia and I drew him pictures as to how this would work.  I have asked him to monitor this, asked him to drink electrolyte solutions when he is out in the hot weather and to call us if he has recurrences we could certainly offer him an ablation which would likely be curative.  He is agreeable to this plan.    We will check a TSH just as a precaution as he has not had one recently.  He got away from the clinic but we can send him back in for a TSH.    His lipids are being managed by Dr. Shelton an aggressive approach to risk factor modification.  He does some exercise but very brief high intensity exercise and push-ups.  I recommended that he do 30 minutes of brisk walking or some equivalent at least 5 times a week in addition to what he is doing now and he will take that under advisement.  This would benefit his cardiovascular system, blood pressure, blood sugars and would likely result in some weight loss as well.    I will plan on seeing him back in 1 year but I have asked him to have a low threshold for calling us with recurrence we can offer him other therapies.    Thank you for allowing us to participate in his care.       History of Present Illness/Subjective    Mr. Monty Mcintyre is a 67 year old male with known history of minimal plaquing in his coronary arteries as  judged by coronary angiography in 2017.  He does have risk factors for coronary artery disease with brothers having bypass surgery and another brother with stents and a father having bypass surgery.  He is on lipid-lowering agent, antihypertensive agent, and is aggressive with his diabetes.  He does exercise doing a high intensity exercise after doing push-ups.  He does this for less than 5 minutes and then takes a shower each day.  He does not get symptoms with this.  He has had some times when he is felt a little bit lightheaded and a little bit shortness of breath for unexplained reasons.  These of been very short-lived, sometimes 1 or 2 holes of golf and then it goes away.  He was playing golf however more recently on a very hot day, it was in the 90s with humidity being very high as well.  All of a sudden after hitting a drive he felt like he could not breathe very well and then noticed that his heart was racing.  He felt a discomfort in his chest.  He got off the course and his brother drove him to the emergency room at [facility] where he was noted to have a significant tachycardia with his twelve-lead showing a heart rate of 178 bpm.  There were nonspecific ST-T wave changes.  He was sedated and cardioverted with restoration of sinus rhythm and immediate resolution of his symptoms.  He has not had the symptoms back since his cardioversion.  His medications were not changed.  He did have a stress echocardiogram and although his functional capacity or exercise time was not great he did not have any evidence of ischemia and he met his heart rate goals.    He denies orthopnea, paroxysmal nocturnal dyspnea, peripheral edema, syncope or near syncopal episodes.  He feels a little bit lightheaded at times and when he had the tachycardia he did feel a little lightheaded as well but was not syncopal.    ECG: Personally reviewed.  ER ECG reviewed and as above       Physical Examination Review of Systems   BP  "120/56 (BP Location: Right arm, Patient Position: Sitting, Cuff Size: Adult Large)   Pulse 72   Resp 16   Ht 1.702 m (5' 7\")   Wt 95.3 kg (210 lb)   BMI 32.89 kg/m    Body mass index is 32.89 kg/m .  Wt Readings from Last 3 Encounters:   07/08/22 95.3 kg (210 lb)   06/20/22 95.3 kg (210 lb)   05/09/18 96.6 kg (212 lb 14.4 oz)     General Appearance:   Alert, cooperative and in no acute distress.   ENT/Mouth: Patient wearing a mask.      EYES:  no scleral icterus, normal conjunctivae   Neck: JVP normal. No Hepatojugular reflux. Thyroid not visualized.   Chest/Lungs:   Lungs are clear to auscultation, equal chest wall expansion.   Cardiovascular:   S1, S2 without murmur ,clicks or rubs. Brachial, radial and posterior tibial pulses are intact and symetric. No carotid bruits noted   Abdomen:  Nontender. BS+. No bruits.   Extremities: No cyanosis, clubbing or edema   Skin: no xanthelasma, warm.    Neurologic: normal arm movement bilateral, no tremors     Psychiatric: Appropriate affect.      Enc Vitals  BP: 120/56  Pulse: 72  Resp: 16  Weight: 95.3 kg (210 lb)  Height: 170.2 cm (5' 7\")                                           Medical History  Surgical History Family History Social History   No past medical history on file. Past Surgical History:   Procedure Laterality Date     CARDIAC CATHETERIZATION  05/22/2017     CARDIAC CATHETERIZATION N/A 5/22/2017    Procedure: Coronary Angiogram;  Surgeon: Paty Johnson MD;  Location: [facility] Cath Lab;  Service:      NH CATH PLMT L HRT & ARTS W/NJX & ANGIO IMG S&I Left 5/22/2017    Procedure: Left Heart Catheterization Without Left Ventriculogram;  Surgeon: Paty Johnson MD;  Location: [facility] Cath Lab;  Service: Cardiology     TOTAL KNEE ARTHROPLASTY Left 2/13/13    Family History   Problem Relation Age of Onset     Coronary Artery Disease Other      Prostate Cancer Other      Diabetes Other      Cancer Brother      Heart Failure Brother      Multiple Sclerosis " "Sister      Coronary Artery Disease Father     Social History     Socioeconomic History     Marital status: Single     Spouse name: Not on file     Number of children: Not on file     Years of education: Not on file     Highest education level: Not on file   Occupational History     Not on file   Tobacco Use     Smoking status: Former Smoker     Quit date: 2012     Years since quittin.9     Smokeless tobacco: Never Used   Substance and Sexual Activity     Alcohol use: Yes     Drug use: No     Sexual activity: Not on file   Other Topics Concern     Not on file   Social History Narrative     Not on file     Social Determinants of Health     Financial Resource Strain: Not on file   Food Insecurity: Not on file   Transportation Needs: Not on file   Physical Activity: Not on file   Stress: Not on file   Social Connections: Not on file   Intimate Partner Violence: Not on file   Housing Stability: Not on file          Medications  Allergies   Current Outpatient Medications   Medication Sig Dispense Refill     allopurinol (ZYLOPRIM) 100 MG tablet Take 1 tablet by mouth daily       aspirin 81 MG EC tablet [ASPIRIN 81 MG EC TABLET] Take 81 mg by mouth daily.       BASAGLAR KWIKPEN U-100 INSULIN 100 unit/mL (3 mL) pen [BASAGLAR KWIKPEN U-100 INSULIN 100 UNIT/ML (3 ML) PEN] INJECT 20 UNITS UNDER THE SKIN DAILY (Patient taking differently: Inject 35 Units Subcutaneous daily) 15 pen 0     Continuous Blood Gluc Sensor (FREESTYLE TANIA 14 DAY SENSOR) MISC        insulin aspart (NOVOLOG FLEXPEN) 100 UNIT/ML pen Sliding scale       losartan (COZAAR) 25 MG tablet Take 1 tablet by mouth daily       OZEMPIC, 0.25 OR 0.5 MG/DOSE, 2 MG/1.5ML SOPN pen once a week       pen needle, diabetic (BD ULTRA-FINE ISAAC PEN NEEDLE) 32 gauge x 532\" Ndle [PEN NEEDLE, DIABETIC (BD ULTRA-FINE ISAAC PEN NEEDLE) 32 GAUGE X 532\" NDLE] USE 1 EACH TO TEST TWICE DAILY AS DIRECTED 100 each 0     propranolol (INDERAL) 10 MG tablet as needed       " rosuvastatin (CRESTOR) 40 MG tablet Take 1 tablet by mouth At Bedtime       tamsulosin (FLOMAX) 0.4 mg cap [TAMSULOSIN (FLOMAX) 0.4 MG CAP] TAKE 1 CAPSULE BY MOUTH DAILY AT BEDTIME 90 capsule 0     atorvastatin (LIPITOR) 80 MG tablet [ATORVASTATIN (LIPITOR) 80 MG TABLET] Take 1 tablet (80 mg total) by mouth at bedtime. 30 tablet 11     chlorthalidone (HYGROTEN) 25 MG tablet [CHLORTHALIDONE (HYGROTEN) 25 MG TABLET] TAKE 1/2 TABLET(12.5 MG) BY MOUTH DAILY (Patient not taking: Reported on 7/8/2022) 45 tablet 0     chlorthalidone (HYGROTEN) 25 MG tablet [CHLORTHALIDONE (HYGROTEN) 25 MG TABLET] TAKE 1/2 TABLET(12.5 MG) BY MOUTH DAILY (Patient not taking: Reported on 7/8/2022) 45 tablet 0     diclofenac sodium (VOLTAREN) 1 % Gel [DICLOFENAC SODIUM (VOLTAREN) 1 % GEL] Apply 1 application topically 4 (four) times a day. (Patient not taking: Reported on 7/8/2022) 100 g 2     HYDROmorphone (DILAUDID) 2 MG tablet [HYDROMORPHONE (DILAUDID) 2 MG TABLET] One at bedtime as needed (Patient not taking: Reported on 7/8/2022) 20 tablet 0     pioglitazone (ACTOS) 30 MG tablet [PIOGLITAZONE (ACTOS) 30 MG TABLET] TAKE 1 TABLET(30 MG) BY MOUTH DAILY (Patient not taking: Reported on 7/8/2022) 90 tablet 0     sildenafil (REVATIO) 20 mg tablet [SILDENAFIL (REVATIO) 20 MG TABLET] 1-5 tabs daily as needed 90 tablet 3     traMADol (ULTRAM) 50 mg tablet [TRAMADOL (ULTRAM) 50 MG TABLET] 1-2 tabs with 1 tylenol three times a day as needed for pain (Patient not taking: Reported on 7/8/2022) 90 tablet 0     VICTOZA 2-BOAZ 0.6 mg/0.1 mL (18 mg/3 mL) PnIj injection [VICTOZA 2-BOAZ 0.6 MG/0.1 ML (18 MG/3 ML) PNIJ INJECTION] ADMINISTER 1.2 MG UNDER THE SKIN DAILY (Patient not taking: Reported on 7/8/2022) 6 mL 0     VICTOZA 2-BOAZ 0.6 mg/0.1 mL (18 mg/3 mL) PnIj injection [VICTOZA 2-BOAZ 0.6 MG/0.1 ML (18 MG/3 ML) PNIJ INJECTION] ADMINISTER 1.2 MG UNDER THE SKIN DAILY (Patient not taking: Reported on 7/8/2022) 6 mL 0    No Known Allergies      Lab Results     Chemistry/lipid CBC Cardiac Enzymes/BNP/TSH/INR   Lab Results   Component Value Date    CHOL 199 05/09/2018    HDL 43 05/09/2018    TRIG 168 (H) 05/09/2018    BUN 42 (H) 06/20/2022     06/20/2022    CO2 15 (L) 06/20/2022    Lab Results   Component Value Date    WBC 8.4 06/20/2022    HGB 12.7 (L) 06/20/2022    HCT 38.7 (L) 06/20/2022    MCV 87 06/20/2022     06/20/2022    Lab Results   Component Value Date    TROPONINI 0.01 06/20/2022

## 2022-07-08 NOTE — LETTER
7/8/2022    ELISABETH SALCEDO  [facility] [address] [place] [address]    RE: Monty Mcintyre       Dear Colleague,     I had the pleasure of seeing Monty Mcin[facility]re in the [facility].      [facility]  [phone]          Assessment/Recommendations   Patient with known history of minimal coronary artery disease plaquing in 2017 who recently presented with abrupt changes in his breathing, rapid heartbeat was noted to be BEAR SVT and cardioverted in the emergency room at [facility].  He has had some other minor episodes which could be related to SVT but not clearly as long-lasting or as dramatic.  He also had some chest discomfort and it is comforting that his stress echocardiogram was checked and is unremarkable.    We talked about the statistical likelihood that this is AV roxann reentry tachycardia and I drew him pictures as to how this would work.  I have asked him to monitor this, asked him to drink electrolyte solutions when he is out in the hot weather and to call us if he has recurrences we could certainly offer him an ablation which would likely be curative.  He is agreeable to this plan.    We will check a TSH just as a precaution as he has not had one recently.  He got away from the clinic but we can send him back in for a TSH.    His lipids are being managed by Dr. Shelton an aggressive approach to risk factor modification.  He does some exercise but very brief high intensity exercise and push-ups.  I recommended that he do 30 minutes of brisk walking or some equivalent at least 5 times a week in addition to what he is doing now and he will take that under advisement.  This would benefit his cardiovascular system, blood pressure, blood sugars and would likely result in some weight loss as well.    I will plan on seeing him back in 1 year but I have asked him to have a low threshold for calling us with recurrence we can offer him other  therapies.    Thank you for allowing us to participate in his care.       History of Present Illness/Subjective    Mr. Monty Mcintyre is a 67 year old male with known history of minimal plaquing in his coronary arteries as judged by coronary angiography in 2017.  He does have risk factors for coronary artery disease with brothers having bypass surgery and another brother with stents and a father having bypass surgery.  He is on lipid-lowering agent, antihypertensive agent, and is aggressive with his diabetes.  He does exercise doing a high intensity exercise after doing push-ups.  He does this for less than 5 minutes and then takes a shower each day.  He does not get symptoms with this.  He has had some times when he is felt a little bit lightheaded and a little bit shortness of breath for unexplained reasons.  These of been very short-lived, sometimes 1 or 2 holes of golf and then it goes away.  He was playing golf however more recently on a very hot day, it was in the 90s with humidity being very high as well.  All of a sudden after hitting a drive he felt like he could not breathe very well and then noticed that his heart was racing.  He felt a discomfort in his chest.  He got off the course and his brother drove him to the emergency room at [facility] where he was noted to have a significant tachycardia with his twelve-lead showing a heart rate of 178 bpm.  There were nonspecific ST-T wave changes.  He was sedated and cardioverted with restoration of sinus rhythm and immediate resolution of his symptoms.  He has not had the symptoms back since his cardioversion.  His medications were not changed.  He did have a stress echocardiogram and although his functional capacity or exercise time was not great he did not have any evidence of ischemia and he met his heart rate goals.    He denies orthopnea, paroxysmal nocturnal dyspnea, peripheral edema, syncope or near syncopal episodes.  He feels a little bit  "lightheaded at times and when he had the tachycardia he did feel a little lightheaded as well but was not syncopal.    ECG: Personally reviewed.  ER ECG reviewed and as above       Physical Examination Review of Systems   /56 (BP Location: Right arm, Patient Position: Sitting, Cuff Size: Adult Large)   Pulse 72   Resp 16   Ht 1.702 m (5' 7\")   Wt 95.3 kg (210 lb)   BMI 32.89 kg/m    Body mass index is 32.89 kg/m .  Wt Readings from Last 3 Encounters:   07/08/22 95.3 kg (210 lb)   06/20/22 95.3 kg (210 lb)   05/09/18 96.6 kg (212 lb 14.4 oz)     General Appearance:   Alert, cooperative and in no acute distress.   ENT/Mouth: Patient wearing a mask.      EYES:  no scleral icterus, normal conjunctivae   Neck: JVP normal. No Hepatojugular reflux. Thyroid not visualized.   Chest/Lungs:   Lungs are clear to auscultation, equal chest wall expansion.   Cardiovascular:   S1, S2 without murmur ,clicks or rubs. Brachial, radial and posterior tibial pulses are intact and symetric. No carotid bruits noted   Abdomen:  Nontender. BS+. No bruits.   Extremities: No cyanosis, clubbing or edema   Skin: no xanthelasma, warm.    Neurologic: normal arm movement bilateral, no tremors     Psychiatric: Appropriate affect.      Enc Vitals  BP: 120/56  Pulse: 72  Resp: 16  Weight: 95.3 kg (210 lb)  Height: 170.2 cm (5' 7\")                                           Medical History  Surgical History Family History Social History   No past medical history on file. Past Surgical History:   Procedure Laterality Date     CARDIAC CATHETERIZATION  05/22/2017     CARDIAC CATHETERIZATION N/A 5/22/2017    Procedure: Coronary Angiogram;  Surgeon: Paty Johnson MD;  Location: [facility] Cath Lab;  Service:      AR CATH PLMT L HRT & ARTS W/NJX & ANGIO IMG S&I Left 5/22/2017    Procedure: Left Heart Catheterization Without Left Ventriculogram;  Surgeon: Paty Johnson MD;  Location: [facility] Cath Lab;  Service: Cardiology     TOTAL KNEE " ARTHROPLASTY Left 13    Family History   Problem Relation Age of Onset     Coronary Artery Disease Other      Prostate Cancer Other      Diabetes Other      Cancer Brother      Heart Failure Brother      Multiple Sclerosis Sister      Coronary Artery Disease Father     Social History     Socioeconomic History     Marital status: Single     Spouse name: Not on file     Number of children: Not on file     Years of education: Not on file     Highest education level: Not on file   Occupational History     Not on file   Tobacco Use     Smoking status: Former Smoker     Quit date: 2012     Years since quittin.9     Smokeless tobacco: Never Used   Substance and Sexual Activity     Alcohol use: Yes     Drug use: No     Sexual activity: Not on file   Other Topics Concern     Not on file   Social History Narrative     Not on file     Social Determinants of Health     Financial Resource Strain: Not on file   Food Insecurity: Not on file   Transportation Needs: Not on file   Physical Activity: Not on file   Stress: Not on file   Social Connections: Not on file   Intimate Partner Violence: Not on file   Housing Stability: Not on file          Medications  Allergies   Current Outpatient Medications   Medication Sig Dispense Refill     allopurinol (ZYLOPRIM) 100 MG tablet Take 1 tablet by mouth daily       aspirin 81 MG EC tablet [ASPIRIN 81 MG EC TABLET] Take 81 mg by mouth daily.       BASAGLAR KWIKPEN U-100 INSULIN 100 unit/mL (3 mL) pen [BASAGLAR KWIKPEN U-100 INSULIN 100 UNIT/ML (3 ML) PEN] INJECT 20 UNITS UNDER THE SKIN DAILY (Patient taking differently: Inject 35 Units Subcutaneous daily) 15 pen 0     Continuous Blood Gluc Sensor (FREESTYLE TANIA 14 DAY SENSOR) MISC        insulin aspart (NOVOLOG FLEXPEN) 100 UNIT/ML pen Sliding scale       losartan (COZAAR) 25 MG tablet Take 1 tablet by mouth daily       OZEMPIC, 0.25 OR 0.5 MG/DOSE, 2 MG/1.5ML SOPN pen once a week       pen needle, diabetic (BD ULTRA-FINE  "ISAAC PEN NEEDLE) 32 gauge x 5/32\" Ndle [PEN NEEDLE, DIABETIC (BD ULTRA-FINE ISAAC PEN NEEDLE) 32 GAUGE X 5/32\" NDLE] USE 1 EACH TO TEST TWICE DAILY AS DIRECTED 100 each 0     propranolol (INDERAL) 10 MG tablet as needed       rosuvastatin (CRESTOR) 40 MG tablet Take 1 tablet by mouth At Bedtime       tamsulosin (FLOMAX) 0.4 mg cap [TAMSULOSIN (FLOMAX) 0.4 MG CAP] TAKE 1 CAPSULE BY MOUTH DAILY AT BEDTIME 90 capsule 0     atorvastatin (LIPITOR) 80 MG tablet [ATORVASTATIN (LIPITOR) 80 MG TABLET] Take 1 tablet (80 mg total) by mouth at bedtime. 30 tablet 11     chlorthalidone (HYGROTEN) 25 MG tablet [CHLORTHALIDONE (HYGROTEN) 25 MG TABLET] TAKE 1/2 TABLET(12.5 MG) BY MOUTH DAILY (Patient not taking: Reported on 7/8/2022) 45 tablet 0     chlorthalidone (HYGROTEN) 25 MG tablet [CHLORTHALIDONE (HYGROTEN) 25 MG TABLET] TAKE 1/2 TABLET(12.5 MG) BY MOUTH DAILY (Patient not taking: Reported on 7/8/2022) 45 tablet 0     diclofenac sodium (VOLTAREN) 1 % Gel [DICLOFENAC SODIUM (VOLTAREN) 1 % GEL] Apply 1 application topically 4 (four) times a day. (Patient not taking: Reported on 7/8/2022) 100 g 2     HYDROmorphone (DILAUDID) 2 MG tablet [HYDROMORPHONE (DILAUDID) 2 MG TABLET] One at bedtime as needed (Patient not taking: Reported on 7/8/2022) 20 tablet 0     pioglitazone (ACTOS) 30 MG tablet [PIOGLITAZONE (ACTOS) 30 MG TABLET] TAKE 1 TABLET(30 MG) BY MOUTH DAILY (Patient not taking: Reported on 7/8/2022) 90 tablet 0     sildenafil (REVATIO) 20 mg tablet [SILDENAFIL (REVATIO) 20 MG TABLET] 1-5 tabs daily as needed 90 tablet 3     traMADol (ULTRAM) 50 mg tablet [TRAMADOL (ULTRAM) 50 MG TABLET] 1-2 tabs with 1 tylenol three times a day as needed for pain (Patient not taking: Reported on 7/8/2022) 90 tablet 0     VICTOZA 2-BOAZ 0.6 mg/0.1 mL (18 mg/3 mL) PnIj injection [VICTOZA 2-BOAZ 0.6 MG/0.1 ML (18 MG/3 ML) PNIJ INJECTION] ADMINISTER 1.2 MG UNDER THE SKIN DAILY (Patient not taking: Reported on 7/8/2022) 6 mL 0     VICTOZA 2-BOAZ 0.6 " mg/0.1 mL (18 mg/3 mL) PnIj injection [VICTOZA 2-BOAZ 0.6 MG/0.1 ML (18 MG/3 ML) PNIJ INJECTION] ADMINISTER 1.2 MG UNDER THE SKIN DAILY (Patient not taking: Reported on 7/8/2022) 6 mL 0    No Known Allergies      Lab Results    Chemistry/lipid CBC Cardiac Enzymes/BNP/TSH/INR   Lab Results   Component Value Date    CHOL 199 05/09/2018    HDL 43 05/09/2018    TRIG 168 (H) 05/09/2018    BUN 42 (H) 06/20/2022     06/20/2022    CO2 15 (L) 06/20/2022    Lab Results   Component Value Date    WBC 8.4 06/20/2022    HGB 12.7 (L) 06/20/2022    HCT 38.7 (L) 06/20/2022    MCV 87 06/20/2022     06/20/2022    Lab Results   Component Value Date    TROPONINI 0.01 06/20/2022            Thank you for allowing me to participate in the care of your patient.    Sincerely,   Saroj Mcpherson MD   [facility]  cc: No referring provider defined for this encounter.

## 2022-07-23 ENCOUNTER — HEALTH MAINTENANCE LETTER (OUTPATIENT)
Age: 67
End: 2022-07-23

## 2022-10-01 ENCOUNTER — HEALTH MAINTENANCE LETTER (OUTPATIENT)
Age: 67
End: 2022-10-01

## 2023-05-14 ENCOUNTER — HEALTH MAINTENANCE LETTER (OUTPATIENT)
Age: 68
End: 2023-05-14

## 2023-08-06 ENCOUNTER — HEALTH MAINTENANCE LETTER (OUTPATIENT)
Age: 68
End: 2023-08-06

## 2023-12-24 ENCOUNTER — HEALTH MAINTENANCE LETTER (OUTPATIENT)
Age: 68
End: 2023-12-24

## 2024-05-08 ENCOUNTER — TRANSFERRED RECORDS (OUTPATIENT)
Dept: HEALTH INFORMATION MANAGEMENT | Facility: CLINIC | Age: 69
End: 2024-05-08
Payer: MEDICARE

## 2024-05-08 LAB
ALBUMIN/CREATININE RATIO: 104 MG/G CREAT
ALT SERPL-CCNC: 44 U/L (ref 9–46)
AST SERPL-CCNC: 28 U/L (ref 10–35)
CHOLESTEROL (EXTERNAL): 128 MG/DL
CREATININE (EXTERNAL): 2.85 MG/DL (ref 0.7–1.35)
CREATININE (URINE): 114 MG/DL (ref 20–320)
GFR ESTIMATED (EXTERNAL): 23 ML/MIN/1.73M2
GLUCOSE (EXTERNAL): 82 MG/DL (ref 65–99)
HBA1C MFR BLD: 6.8 %
HDLC SERPL-MCNC: 43 MG/DL
LDL CHOLESTEROL CALCULATED (EXTERNAL): 58 MG/DL
NON HDL CHOLESTEROL (EXTERNAL): 85 MG/DL
POTASSIUM (EXTERNAL): 4.7 MMOL/L (ref 3.5–5.3)
TRIGLYCERIDES (EXTERNAL): 208 MG/DL
TSH SERPL-ACNC: 2.19 MIU/L (ref 0.4–4.5)

## 2024-05-09 DIAGNOSIS — R07.89 CHEST DISCOMFORT: ICD-10-CM

## 2024-05-09 DIAGNOSIS — I47.10 SVT (SUPRAVENTRICULAR TACHYCARDIA) (H): ICD-10-CM

## 2024-05-09 DIAGNOSIS — R06.00 DYSPNEA: ICD-10-CM

## 2024-05-09 DIAGNOSIS — I25.10 CAD (CORONARY ARTERY DISEASE): Primary | ICD-10-CM

## 2024-05-09 NOTE — PROGRESS NOTES
From: Saroj Mcpherson MD  Sent: 5/9/2024   3:50 PM CDT  To: Vanna Prabhakar,    Spoke with this patient's primary care physician Dr. Shelton.  He has been trying to order a stress test.  Could you make sure that the patient is on for a stress nuclear.  This should be on the treadmill.    Thank you,    Saroj

## 2024-05-13 ENCOUNTER — HOSPITAL ENCOUNTER (OUTPATIENT)
Dept: NUCLEAR MEDICINE | Facility: CLINIC | Age: 69
Discharge: HOME | End: 2024-05-13
Attending: INTERNAL MEDICINE
Payer: MEDICARE

## 2024-05-13 ENCOUNTER — HOSPITAL ENCOUNTER (OUTPATIENT)
Dept: CARDIOLOGY | Facility: CLINIC | Age: 69
Discharge: HOME | End: 2024-05-13
Attending: INTERNAL MEDICINE
Payer: MEDICARE

## 2024-05-13 DIAGNOSIS — R07.89 CHEST DISCOMFORT: ICD-10-CM

## 2024-05-13 DIAGNOSIS — R06.00 DYSPNEA: ICD-10-CM

## 2024-05-13 DIAGNOSIS — I25.10 CAD (CORONARY ARTERY DISEASE): ICD-10-CM

## 2024-05-13 DIAGNOSIS — I47.10 SVT (SUPRAVENTRICULAR TACHYCARDIA) (H): ICD-10-CM

## 2024-05-13 LAB
CV STRESS CURRENT BP HE: NORMAL
CV STRESS CURRENT HR HE: 101
CV STRESS CURRENT HR HE: 102
CV STRESS CURRENT HR HE: 102
CV STRESS CURRENT HR HE: 103
CV STRESS CURRENT HR HE: 109
CV STRESS CURRENT HR HE: 110
CV STRESS CURRENT HR HE: 110
CV STRESS CURRENT HR HE: 55
CV STRESS CURRENT HR HE: 66
CV STRESS CURRENT HR HE: 70
CV STRESS CURRENT HR HE: 70
CV STRESS CURRENT HR HE: 72
CV STRESS CURRENT HR HE: 73
CV STRESS CURRENT HR HE: 74
CV STRESS CURRENT HR HE: 74
CV STRESS CURRENT HR HE: 76
CV STRESS CURRENT HR HE: 79
CV STRESS CURRENT HR HE: 82
CV STRESS CURRENT HR HE: 86
CV STRESS CURRENT HR HE: 95
CV STRESS CURRENT HR HE: 95
CV STRESS CURRENT HR HE: 96
CV STRESS DEVIATION TIME HE: NORMAL
CV STRESS ECHO PERCENT HR HE: NORMAL
CV STRESS EXERCISE STAGE HE: NORMAL
CV STRESS EXERCISE STAGE REACHED HE: NORMAL
CV STRESS FINAL RESTING BP HE: NORMAL
CV STRESS FINAL RESTING HR HE: 72
CV STRESS MAX HR HE: 111
CV STRESS MAX TREADMILL GRADE HE: 0
CV STRESS MAX TREADMILL SPEED HE: 0
CV STRESS PEAK DIA BP HE: NORMAL
CV STRESS PEAK SYS BP HE: NORMAL
CV STRESS PHASE HE: NORMAL
CV STRESS PROTOCOL HE: NORMAL
CV STRESS RESTING PT POSITION HE: NORMAL
CV STRESS RESTING PT POSITION HE: NORMAL
CV STRESS ST DEVIATION AMOUNT HE: NORMAL
CV STRESS ST DEVIATION ELEVATION HE: NORMAL
CV STRESS ST EVELATION AMOUNT HE: NORMAL
CV STRESS TEST TYPE HE: NORMAL
CV STRESS TOTAL STAGE TIME MIN 1 HE: NORMAL
NUC STRESS EJECTION FRACTION: 64 %
RATE PRESSURE PRODUCT: NORMAL
STRESS ECHO BASELINE DIASTOLIC HE: 77
STRESS ECHO BASELINE HR: 55
STRESS ECHO BASELINE SYSTOLIC BP: 159
STRESS ECHO CALCULATED PERCENT HR: 74 %
STRESS ECHO LAST STRESS DIASTOLIC BP: 69
STRESS ECHO LAST STRESS HR: 70
STRESS ECHO LAST STRESS SYSTOLIC BP: 171
STRESS ECHO TARGET HR: 151
STRESS/REST PERFUSION RATIO: 1.52

## 2024-05-13 PROCEDURE — 93016 CV STRESS TEST SUPVJ ONLY: CPT | Performed by: INTERNAL MEDICINE

## 2024-05-13 PROCEDURE — A9500 TC99M SESTAMIBI: HCPCS | Performed by: INTERNAL MEDICINE

## 2024-05-13 PROCEDURE — 343N000001 HC RX 343: Performed by: INTERNAL MEDICINE

## 2024-05-13 PROCEDURE — 78452 HT MUSCLE IMAGE SPECT MULT: CPT | Mod: 26 | Performed by: INTERNAL MEDICINE

## 2024-05-13 PROCEDURE — 93017 CV STRESS TEST TRACING ONLY: CPT

## 2024-05-13 PROCEDURE — 250N000011 HC RX IP 250 OP 636: Mod: JZ | Performed by: INTERNAL MEDICINE

## 2024-05-13 PROCEDURE — 78452 HT MUSCLE IMAGE SPECT MULT: CPT | Mod: ME

## 2024-05-13 PROCEDURE — 93018 CV STRESS TEST I&R ONLY: CPT | Performed by: INTERNAL MEDICINE

## 2024-05-13 PROCEDURE — G1010 CDSM STANSON: HCPCS | Performed by: INTERNAL MEDICINE

## 2024-05-13 RX ORDER — AMINOPHYLLINE 25 MG/ML
50 INJECTION, SOLUTION INTRAVENOUS
Status: DISCONTINUED | OUTPATIENT
Start: 2024-05-13 | End: 2024-05-13 | Stop reason: HOSPADM

## 2024-05-13 RX ORDER — REGADENOSON 0.08 MG/ML
0.4 INJECTION, SOLUTION INTRAVENOUS ONCE
Status: COMPLETED | OUTPATIENT
Start: 2024-05-13 | End: 2024-05-13

## 2024-05-13 RX ORDER — EMPAGLIFLOZIN 10 MG/1
10 TABLET, FILM COATED ORAL DAILY
COMMUNITY
Start: 2024-05-01

## 2024-05-13 RX ADMIN — Medication 8.32 MILLICURIE: at 07:34

## 2024-05-13 RX ADMIN — REGADENOSON 0.4 MG: 0.08 INJECTION, SOLUTION INTRAVENOUS at 08:23

## 2024-05-13 RX ADMIN — Medication 31.8 MILLICURIE: at 08:25

## 2024-05-16 ENCOUNTER — OFFICE VISIT (OUTPATIENT)
Dept: CARDIOLOGY | Facility: CLINIC | Age: 69
End: 2024-05-16
Payer: MEDICARE

## 2024-05-16 ENCOUNTER — TELEPHONE (OUTPATIENT)
Dept: CARDIOLOGY | Facility: CLINIC | Age: 69
End: 2024-05-16

## 2024-05-16 VITALS
SYSTOLIC BLOOD PRESSURE: 166 MMHG | RESPIRATION RATE: 16 BRPM | HEART RATE: 60 BPM | BODY MASS INDEX: 33.6 KG/M2 | DIASTOLIC BLOOD PRESSURE: 60 MMHG | WEIGHT: 214.1 LBS | HEIGHT: 67 IN

## 2024-05-16 DIAGNOSIS — R06.09 DYSPNEA ON EXERTION: Primary | ICD-10-CM

## 2024-05-16 DIAGNOSIS — E11.22 TYPE 2 DIABETES MELLITUS WITH DIABETIC CHRONIC KIDNEY DISEASE, UNSPECIFIED CKD STAGE, UNSPECIFIED WHETHER LONG TERM INSULIN USE (H): ICD-10-CM

## 2024-05-16 DIAGNOSIS — E78.00 PURE HYPERCHOLESTEROLEMIA: ICD-10-CM

## 2024-05-16 DIAGNOSIS — R07.89 CHEST DISCOMFORT: ICD-10-CM

## 2024-05-16 DIAGNOSIS — Z82.49 FAMILY HISTORY OF ISCHEMIC HEART DISEASE: ICD-10-CM

## 2024-05-16 DIAGNOSIS — I25.10 CAD (CORONARY ARTERY DISEASE): ICD-10-CM

## 2024-05-16 DIAGNOSIS — I10 BENIGN ESSENTIAL HYPERTENSION: ICD-10-CM

## 2024-05-16 PROCEDURE — 99215 OFFICE O/P EST HI 40 MIN: CPT | Performed by: INTERNAL MEDICINE

## 2024-05-16 NOTE — TELEPHONE ENCOUNTER
Discussion with MADISON. Arrange patient for urgent Cors. LHC and poss PCI.   Ozempic given yesterday on Wednesday. Discussion with MADISON, proceed with procedure on Mon/Tuesday next week, even with Ozempic given. Urgent cath. Instructed patient to lay low until procedure completed.   Informed patient will contact in the am to arrange for Mon/Tu next week. Verbalized understanding.   Case request placed. SM to procedural schedulers.   Case Type: CORS, LHC poss PCI   Ordering Provider: MADISON   H&P: 5/16   Alerts: RENAL, Diabetic   Additional Info/Urgency: urgent, Mon 5/20 or Tu 5/21   Orders for Pre-Procedure Labs? Yes.   Teach: CMM,RN

## 2024-05-16 NOTE — LETTER
5/16/2024    ELISABETH SALCEDO  You Doctor [facility] [address] [place] [address]    RE: Monty Mcintyre       Dear Colleague,     I had the pleasure of seeing Monty Mcintyre in the [facility].      [facility]  [phone]          Assessment/Recommendations   Patient with known coronary artery disease with minimal plaquing in 2017, multiple risk factors for coronary artery disease including type 2 diabetes, dramatically positive family history of premature coronary artery disease, hypertension, type 2 diabetes and a very minimal smoking history.  He has had some shortness of breath in the past but has been more consistent of late with physical activity.  The shortness of breath comes on with walking on the golf course, sometimes even just teeing off and came on when he was riding his bicycle around the block just sitting on the bike riding.  He has had that shortness of breath when doing some light gardening in the yard as well.  He was set up for a treadmill nuclear study and did walk on a tread mill and got his heart rate up to 110.  Because he did not achieve 85% of predicted maximal heart rate he was also given intravenous regadenoson.  There were no areas of myocardial ischemia or infarction but he did have transient ischemic dilatation.    Given his multiple risk factors, concerning symptomatology, and equivocal stress test, I would favor coronary angiography.  CT angiography would be reasonable but would require more contrast than diagnostic angiography and he has significant renal insufficiency.  Lab work earlier this month showed a BUN of 50 and a creatinine of 2.85.  Dr. Shelton discussed his case with the nephrologist who agreed that angiography would be warranted given the situation and we will use a renal protocol to mitigate contrast-induced worsening of his renal insufficiency.    Patient is agreement with coronary angiography would also recommend left  heart cath to see if his left ventricular end-diastolic pressure is elevated.    Further recommendations pending the results of the study.  Patient takes an antiplatelet agent.       History of Present Illness/Subjective    Mr. Monty Mcintyre is a 68 year old male with above risk factors for coronary artery disease and shortness of breath sometimes with minimal activity.  He had an episode of shortness of breath about 4 years ago while on the golf course but this was self-limited but it did last 30 minutes and then abated.  A couple of years ago he had a another episode of shortness of breath that was associated with SVT.  He was brought in and treated in the emergency room and has not had any recurrence of SVT.  This year he has noticed more episodes of shortness of breath which are more consistent.  He has noted walking on the golf course that he will get short of breath.  If he takes a cart he is okay but sometimes even when he goes to hit the golf ball of the [place]ctor, he will get short of breath he had an episode of shortness of breath biking around the block which surprised him as he was just sitting on the bike pedaling and not doing any hills.  Later that same day got short of breath doing some very light landscaping work/yard work.    He has had a couple of times when he felt a little short of breath when he woke up at night.  He has gotten up and gone to the bathroom and it seems to naresh.  This is possibly consistent with paroxysmal nocturnal dyspnea.  He denies orthopnea, peripheral edema, and has no palpitations, syncope or near syncopal episodes and has no history of cerebrovascular accident or TIA.  2 brothers have had coronary artery disease in his father had his first presentation of coronary artery disease at age 54 and and had 2 separate bypass surgeries.    He is diabetic, is treated for hypertension, treated for hyperlipidemia, and has a positive family history of coronary artery disease.      "    Physical Examination Review of Systems   BP (!) 166/60 (BP Location: Right arm, Patient Position: Sitting, Cuff Size: Adult Large)   Pulse 60   Resp 16   Ht 1.702 m (5' 7\")   Wt 97.1 kg (214 lb 1.6 oz)   BMI 33.53 kg/m    Body mass index is 33.53 kg/m .  Wt Readings from Last 3 Encounters:   05/16/24 97.1 kg (214 lb 1.6 oz)   07/08/22 95.3 kg (210 lb)   06/20/22 95.3 kg (210 lb)     General Appearance:   Alert, cooperative and in no acute distress.   ENT/Mouth: Pink/moist oral mucosa   EYES:  no scleral icterus, normal conjunctivae   Neck: JVP normal. No Hepatojugular reflux. Thyroid not visualized.   Chest/Lungs:   Lungs are clear to auscultation, equal chest wall expansion.   Cardiovascular:   S1, S2 with 1/6 systolic murmur , no clicks or rubs. Brachial, radial and posterior tibial pulses are intact and symetric. No carotid bruits noted   Abdomen:  Nontender.    Extremities: No cyanosis, clubbing or edema   Skin: no xanthelasma, warm.    Neurologic: normal arm movement bilateral, no tremors     Psychiatric: Appropriate affect.      Enc Vitals  BP: (!) 166/60  Pulse: 60  Resp: 16  Weight: 97.1 kg (214 lb 1.6 oz)  Height: 170.2 cm (5' 7\")                                           Medical History  Surgical History Family History Social History   No past medical history on file. Past Surgical History:   Procedure Laterality Date    CARDIAC CATHETERIZATION  05/22/2017    CARDIAC CATHETERIZATION N/A 5/22/2017    Procedure: Coronary Angiogram;  Surgeon: aPty Johnson MD;  Location: [facility] Lab;  Service:     CA CATH PLMT L HRT & ARTS W/NJX & ANGIO IMG S&I Left 5/22/2017    Procedure: Left Heart Catheterization Without Left Ventriculogram;  Surgeon: Paty Johnson MD;  Location: [facility] Cath Lab;  Service: Cardiology    TOTAL KNEE ARTHROPLASTY Left 2/13/13    Family History   Problem Relation Age of Onset    Coronary Artery Disease Other     Prostate Cancer Other     Diabetes Other     Cancer " Brother     Heart Failure Brother     Multiple Sclerosis Sister     Coronary Artery Disease Father     Social History     Socioeconomic History    Marital status: Single     Spouse name: Not on file    Number of children: Not on file    Years of education: Not on file    Highest education level: Not on file   Occupational History    Not on file   Tobacco Use    Smoking status: Former     Current packs/day: 0.00     Types: Cigarettes     Quit date: 2012     Years since quittin.7    Smokeless tobacco: Never   Vaping Use    Vaping status: Never Used   Substance and Sexual Activity    Alcohol use: Yes    Drug use: No    Sexual activity: Not on file   Other Topics Concern    Not on file   Social History Narrative    Not on file     Social Determinants of Health     Financial Resource Strain: Not on file   Food Insecurity: Not on file   Transportation Needs: Not on file   Physical Activity: Not on file   Stress: Not on file   Social Connections: Not on file   Interpersonal Safety: Not on file   Housing Stability: Not on file          Medications  Allergies   Current Outpatient Medications   Medication Sig Dispense Refill    allopurinol (ZYLOPRIM) 100 MG tablet Take 1 tablet by mouth daily      aspirin 81 MG EC tablet [ASPIRIN 81 MG EC TABLET] Take 81 mg by mouth daily.      BASAGLAR KWIKPEN U-100 INSULIN 100 unit/mL (3 mL) pen [BASAGLAR KWIKPEN U-100 INSULIN 100 UNIT/ML (3 ML) PEN] INJECT 20 UNITS UNDER THE SKIN DAILY (Patient taking differently: Inject 28 Units Subcutaneous daily) 15 pen 0    Continuous Blood Gluc Sensor (FREESTYLE TANIA 14 DAY SENSOR) MISC       insulin aspart (NOVOLOG FLEXPEN) 100 UNIT/ML pen Sliding scale      JARDIANCE 10 MG TABS tablet Take 10 mg by mouth daily      losartan (COZAAR) 25 MG tablet Take 1 tablet by mouth daily      OZEMPIC, 0.25 OR 0.5 MG/DOSE, 2 MG/1.5ML SOPN pen Inject 0.25 mg Subcutaneous every 7 days      pen needle, diabetic (BD ULTRA-FINE ISAAC PEN NEEDLE) 32 gauge x  "5/32\" Ndle [PEN NEEDLE, DIABETIC (BD ULTRA-FINE ISAAC PEN NEEDLE) 32 GAUGE X 5/32\" NDLE] USE 1 EACH TO TEST TWICE DAILY AS DIRECTED 100 each 0    propranolol (INDERAL) 10 MG tablet Take 10 mg by mouth as needed      rosuvastatin (CRESTOR) 40 MG tablet Take 1 tablet by mouth At Bedtime      sildenafil (REVATIO) 20 mg tablet [SILDENAFIL (REVATIO) 20 MG TABLET] 1-5 tabs daily as needed 90 tablet 3    tamsulosin (FLOMAX) 0.4 mg cap [TAMSULOSIN (FLOMAX) 0.4 MG CAP] TAKE 1 CAPSULE BY MOUTH DAILY AT BEDTIME 90 capsule 0    No Known Allergies      Lab Results    Chemistry/lipid CBC Cardiac Enzymes/BNP/TSH/INR   Lab Results   Component Value Date    CHOL 199 05/09/2018    HDL 43 05/09/2018    TRIG 168 (H) 05/09/2018    BUN 42 (H) 06/20/2022     06/20/2022    CO2 15 (L) 06/20/2022    Lab Results   Component Value Date    WBC 8.4 06/20/2022    HGB 12.7 (L) 06/20/2022    HCT 38.7 (L) 06/20/2022    MCV 87 06/20/2022     06/20/2022    Lab Results   Component Value Date    TROPONINI 0.01 06/20/2022              Thank you for allowing me to participate in the care of your patient.      Sincerely,     Saroj Mcpherson MD   [facility]  cc:   Fahad Mack  YOU DOCTOR [facility]  [address] [place] [address]      "

## 2024-05-16 NOTE — PROGRESS NOTES
[facility]  [phone]          Assessment/Recommendations   Patient with known coronary artery disease with minimal plaquing in 2017, multiple risk factors for coronary artery disease including type 2 diabetes, dramatically positive family history of premature coronary artery disease, hypertension, type 2 diabetes and a very minimal smoking history.  He has had some shortness of breath in the past but has been more consistent of late with physical activity.  The shortness of breath comes on with walking on the golf course, sometimes even just teeing off and came on when he was riding his bicycle around the block just sitting on the bike riding.  He has had that shortness of breath when doing some light gardening in the yard as well.  He was set up for a treadmill nuclear study and did walk on a tread mill and got his heart rate up to 110.  Because he did not achieve 85% of predicted maximal heart rate he was also given intravenous regadenoson.  There were no areas of myocardial ischemia or infarction but he did have transient ischemic dilatation.    Given his multiple risk factors, concerning symptomatology, and equivocal stress test, I would favor coronary angiography.  CT angiography would be reasonable but would require more contrast than diagnostic angiography and he has significant renal insufficiency.  Lab work earlier this month showed a BUN of 50 and a creatinine of 2.85.  Dr. Shelton discussed his case with the nephrologist who agreed that angiography would be warranted given the situation and we will use a renal protocol to mitigate contrast-induced worsening of his renal insufficiency.    Patient is agreement with coronary angiography would also recommend left heart cath to see if his left ventricular end-diastolic pressure is elevated.    Further recommendations pending the results of the study.  Patient takes an antiplatelet agent.       History of Present Illness/Subjective   "  Mr. Monty Mcintyre is a 68 year old male with above risk factors for coronary artery disease and shortness of breath sometimes with minimal activity.  He had an episode of shortness of breath about 4 years ago while on the golf course but this was self-limited but it did last 30 minutes and then abated.  A couple of years ago he had a another episode of shortness of breath that was associated with SVT.  He was brought in and treated in the emergency room and has not had any recurrence of SVT.  This year he has noticed more episodes of shortness of breath which are more consistent.  He has noted walking on the golf course that he will get short of breath.  If he takes a cart he is okay but sometimes even when he goes to hit the golf ball of the [place], he will get short of breath he had an episode of shortness of breath biking around the block which surprised him as he was just sitting on the bike pedaling and not doing any hills.  Later that same day got short of breath doing some very light landscaping work/yard work.    He has had a couple of times when he felt a little short of breath when he woke up at night.  He has gotten up and gone to the bathroom and it seems to naresh.  This is possibly consistent with paroxysmal nocturnal dyspnea.  He denies orthopnea, peripheral edema, and has no palpitations, syncope or near syncopal episodes and has no history of cerebrovascular accident or TIA.  2 brothers have had coronary artery disease in his father had his first presentation of coronary artery disease at age 54 and and had 2 separate bypass surgeries.    He is diabetic, is treated for hypertension, treated for hyperlipidemia, and has a positive family history of coronary artery disease.         Physical Examination Review of Systems   BP (!) 166/60 (BP Location: Right arm, Patient Position: Sitting, Cuff Size: Adult Large)   Pulse 60   Resp 16   Ht 1.702 m (5' 7\")   Wt 97.1 kg (214 lb 1.6 oz)   BMI 33.53 kg/m  " "  Body mass index is 33.53 kg/m .  Wt Readings from Last 3 Encounters:   05/16/24 97.1 kg (214 lb 1.6 oz)   07/08/22 95.3 kg (210 lb)   06/20/22 95.3 kg (210 lb)     General Appearance:   Alert, cooperative and in no acute distress.   ENT/Mouth: Pink/moist oral mucosa   EYES:  no scleral icterus, normal conjunctivae   Neck: JVP normal. No Hepatojugular reflux. Thyroid not visualized.   Chest/Lungs:   Lungs are clear to auscultation, equal chest wall expansion.   Cardiovascular:   S1, S2 with 1/6 systolic murmur , no clicks or rubs. Brachial, radial and posterior tibial pulses are intact and symetric. No carotid bruits noted   Abdomen:  Nontender.    Extremities: No cyanosis, clubbing or edema   Skin: no xanthelasma, warm.    Neurologic: normal arm movement bilateral, no tremors     Psychiatric: Appropriate affect.      Enc Vitals  BP: (!) 166/60  Pulse: 60  Resp: 16  Weight: 97.1 kg (214 lb 1.6 oz)  Height: 170.2 cm (5' 7\")                                           Medical History  Surgical History Family History Social History   No past medical history on file. Past Surgical History:   Procedure Laterality Date    CARDIAC CATHETERIZATION  05/22/2017    CARDIAC CATHETERIZATION N/A 5/22/2017    Procedure: Coronary Angiogram;  Surgeon: Paty Johnson MD;  Location: [facility] Cath Lab;  Service:     OK CATH PLMT L HRT & ARTS W/NJX & ANGIO IMG S&I Left 5/22/2017    Procedure: Left Heart Catheterization Without Left Ventriculogram;  Surgeon: Paty Johnson MD;  Location: [facility] Cath Lab;  Service: Cardiology    TOTAL KNEE ARTHROPLASTY Left 2/13/13    Family History   Problem Relation Age of Onset    Coronary Artery Disease Other     Prostate Cancer Other     Diabetes Other     Cancer Brother     Heart Failure Brother     Multiple Sclerosis Sister     Coronary Artery Disease Father     Social History     Socioeconomic History    Marital status: Single     Spouse name: Not on file    Number of children: Not on " "file    Years of education: Not on file    Highest education level: Not on file   Occupational History    Not on file   Tobacco Use    Smoking status: Former     Current packs/day: 0.00     Types: Cigarettes     Quit date: 2012     Years since quittin.7    Smokeless tobacco: Never   Vaping Use    Vaping status: Never Used   Substance and Sexual Activity    Alcohol use: Yes    Drug use: No    Sexual activity: Not on file   Other Topics Concern    Not on file   Social History Narrative    Not on file     Social Determinants of Health     Financial Resource Strain: Not on file   Food Insecurity: Not on file   Transportation Needs: Not on file   Physical Activity: Not on file   Stress: Not on file   Social Connections: Not on file   Interpersonal Safety: Not on file   Housing Stability: Not on file          Medications  Allergies   Current Outpatient Medications   Medication Sig Dispense Refill    allopurinol (ZYLOPRIM) 100 MG tablet Take 1 tablet by mouth daily      aspirin 81 MG EC tablet [ASPIRIN 81 MG EC TABLET] Take 81 mg by mouth daily.      BASAGLAR KWIKPEN U-100 INSULIN 100 unit/mL (3 mL) pen [BASAGLAR KWIKPEN U-100 INSULIN 100 UNIT/ML (3 ML) PEN] INJECT 20 UNITS UNDER THE SKIN DAILY (Patient taking differently: Inject 28 Units Subcutaneous daily) 15 pen 0    Continuous Blood Gluc Sensor (FREESTYLE TANIA 14 DAY SENSOR) MISC       insulin aspart (NOVOLOG FLEXPEN) 100 UNIT/ML pen Sliding scale      JARDIANCE 10 MG TABS tablet Take 10 mg by mouth daily      losartan (COZAAR) 25 MG tablet Take 1 tablet by mouth daily      OZEMPIC, 0.25 OR 0.5 MG/DOSE, 2 MG/1.5ML SOPN pen Inject 0.25 mg Subcutaneous every 7 days      pen needle, diabetic (BD ULTRA-FINE ISAAC PEN NEEDLE) 32 gauge x 5/32\" Ndle [PEN NEEDLE, DIABETIC (BD ULTRA-FINE ISAAC PEN NEEDLE) 32 GAUGE X 5/32\" NDLE] USE 1 EACH TO TEST TWICE DAILY AS DIRECTED 100 each 0    propranolol (INDERAL) 10 MG tablet Take 10 mg by mouth as needed      rosuvastatin " (CRESTOR) 40 MG tablet Take 1 tablet by mouth At Bedtime      sildenafil (REVATIO) 20 mg tablet [SILDENAFIL (REVATIO) 20 MG TABLET] 1-5 tabs daily as needed 90 tablet 3    tamsulosin (FLOMAX) 0.4 mg cap [TAMSULOSIN (FLOMAX) 0.4 MG CAP] TAKE 1 CAPSULE BY MOUTH DAILY AT BEDTIME 90 capsule 0    No Known Allergies      Lab Results    Chemistry/lipid CBC Cardiac Enzymes/BNP/TSH/INR   Lab Results   Component Value Date    CHOL 199 05/09/2018    HDL 43 05/09/2018    TRIG 168 (H) 05/09/2018    BUN 42 (H) 06/20/2022     06/20/2022    CO2 15 (L) 06/20/2022    Lab Results   Component Value Date    WBC 8.4 06/20/2022    HGB 12.7 (L) 06/20/2022    HCT 38.7 (L) 06/20/2022    MCV 87 06/20/2022     06/20/2022    Lab Results   Component Value Date    TROPONINI 0.01 06/20/2022

## 2024-05-16 NOTE — H&P (VIEW-ONLY)
[facility]  [phone]          Assessment/Recommendations   Patient with known coronary artery disease with minimal plaquing in 2017, multiple risk factors for coronary artery disease including type 2 diabetes, dramatically positive family history of premature coronary artery disease, hypertension, type 2 diabetes and a very minimal smoking history.  He has had some shortness of breath in the past but has been more consistent of late with physical activity.  The shortness of breath comes on with walking on the golf course, sometimes even just teeing off and came on when he was riding his bicycle around the block just sitting on the bike riding.  He has had that shortness of breath when doing some light gardening in the yard as well.  He was set up for a treadmill nuclear study and did walk on a tread mill and got his heart rate up to 110.  Because he did not achieve 85% of predicted maximal heart rate he was also given intravenous regadenoson.  There were no areas of myocardial ischemia or infarction but he did have transient ischemic dilatation.    Given his multiple risk factors, concerning symptomatology, and equivocal stress test, I would favor coronary angiography.  CT angiography would be reasonable but would require more contrast than diagnostic angiography and he has significant renal insufficiency.  Lab work earlier this month showed a BUN of 50 and a creatinine of 2.85.  Dr. Shelton discussed his case with the nephrologist who agreed that angiography would be warranted given the situation and we will use a renal protocol to mitigate contrast-induced worsening of his renal insufficiency.    Patient is agreement with coronary angiography would also recommend left heart cath to see if his left ventricular end-diastolic pressure is elevated.    Further recommendations pending the results of the study.  Patient takes an antiplatelet agent.       History of Present Illness/Subjective   "  Mr. Monty Mcintyre is a 68 year old male with above risk factors for coronary artery disease and shortness of breath sometimes with minimal activity.  He had an episode of shortness of breath about 4 years ago while on the golf course but this was self-limited but it did last 30 minutes and then abated.  A couple of years ago he had a another episode of shortness of breath that was associated with SVT.  He was brought in and treated in the emergency room and has not had any recurrence of SVT.  This year he has noticed more episodes of shortness of breath which are more consistent.  He has noted walking on the golf course that he will get short of breath.  If he takes a cart he is okay but sometimes even when he goes to hit the golf ball of the hector, he will get short of breath he had an episode of shortness of breath biking around the block which surprised him as he was just sitting on the bike pedaling and not doing any hills.  Later that same day got short of breath doing some very light landscaping work/yard work.    He has had a couple of times when he felt a little short of breath when he woke up at night.  He has gotten up and gone to the bathroom and it seems to naresh.  This is possibly consistent with paroxysmal nocturnal dyspnea.  He denies orthopnea, peripheral edema, and has no palpitations, syncope or near syncopal episodes and has no history of cerebrovascular accident or TIA.  2 brothers have had coronary artery disease in his father had his first presentation of coronary artery disease at age 54 and and had 2 separate bypass surgeries.    He is diabetic, is treated for hypertension, treated for hyperlipidemia, and has a positive family history of coronary artery disease.         Physical Examination Review of Systems   BP (!) 166/60 (BP Location: Right arm, Patient Position: Sitting, Cuff Size: Adult Large)   Pulse 60   Resp 16   Ht 1.702 m (5' 7\")   Wt 97.1 kg (214 lb 1.6 oz)   BMI 33.53 kg/m  " "  Body mass index is 33.53 kg/m .  Wt Readings from Last 3 Encounters:   05/16/24 97.1 kg (214 lb 1.6 oz)   07/08/22 95.3 kg (210 lb)   06/20/22 95.3 kg (210 lb)     General Appearance:   Alert, cooperative and in no acute distress.   ENT/Mouth: Pink/moist oral mucosa   EYES:  no scleral icterus, normal conjunctivae   Neck: JVP normal. No Hepatojugular reflux. Thyroid not visualized.   Chest/Lungs:   Lungs are clear to auscultation, equal chest wall expansion.   Cardiovascular:   S1, S2 with 1/6 systolic murmur , no clicks or rubs. Brachial, radial and posterior tibial pulses are intact and symetric. No carotid bruits noted   Abdomen:  Nontender.    Extremities: No cyanosis, clubbing or edema   Skin: no xanthelasma, warm.    Neurologic: normal arm movement bilateral, no tremors     Psychiatric: Appropriate affect.      Enc Vitals  BP: (!) 166/60  Pulse: 60  Resp: 16  Weight: 97.1 kg (214 lb 1.6 oz)  Height: 170.2 cm (5' 7\")                                           Medical History  Surgical History Family History Social History   No past medical history on file. Past Surgical History:   Procedure Laterality Date    CARDIAC CATHETERIZATION  05/22/2017    CARDIAC CATHETERIZATION N/A 5/22/2017    Procedure: Coronary Angiogram;  Surgeon: Paty Johnson MD;  Location: [facility] Lab;  Service:     ME CATH PLMT L HRT & ARTS W/NJX & ANGIO IMG S&I Left 5/22/2017    Procedure: Left Heart Catheterization Without Left Ventriculogram;  Surgeon: Paty Johnson MD;  Location: [facility] Cath Lab;  Service: Cardiology    TOTAL KNEE ARTHROPLASTY Left 2/13/13    Family History   Problem Relation Age of Onset    Coronary Artery Disease Other     Prostate Cancer Other     Diabetes Other     Cancer Brother     Heart Failure Brother     Multiple Sclerosis Sister     Coronary Artery Disease Father     Social History     Socioeconomic History    Marital status: Single     Spouse name: Not on file    Number of children: Not on " "file    Years of education: Not on file    Highest education level: Not on file   Occupational History    Not on file   Tobacco Use    Smoking status: Former     Current packs/day: 0.00     Types: Cigarettes     Quit date: 2012     Years since quittin.7    Smokeless tobacco: Never   Vaping Use    Vaping status: Never Used   Substance and Sexual Activity    Alcohol use: Yes    Drug use: No    Sexual activity: Not on file   Other Topics Concern    Not on file   Social History Narrative    Not on file     Social Determinants of Health     Financial Resource Strain: Not on file   Food Insecurity: Not on file   Transportation Needs: Not on file   Physical Activity: Not on file   Stress: Not on file   Social Connections: Not on file   Interpersonal Safety: Not on file   Housing Stability: Not on file          Medications  Allergies   Current Outpatient Medications   Medication Sig Dispense Refill    allopurinol (ZYLOPRIM) 100 MG tablet Take 1 tablet by mouth daily      aspirin 81 MG EC tablet [ASPIRIN 81 MG EC TABLET] Take 81 mg by mouth daily.      BASAGLAR KWIKPEN U-100 INSULIN 100 unit/mL (3 mL) pen [BASAGLAR KWIKPEN U-100 INSULIN 100 UNIT/ML (3 ML) PEN] INJECT 20 UNITS UNDER THE SKIN DAILY (Patient taking differently: Inject 28 Units Subcutaneous daily) 15 pen 0    Continuous Blood Gluc Sensor (FREESTYLE TANIA 14 DAY SENSOR) MISC       insulin aspart (NOVOLOG FLEXPEN) 100 UNIT/ML pen Sliding scale      JARDIANCE 10 MG TABS tablet Take 10 mg by mouth daily      losartan (COZAAR) 25 MG tablet Take 1 tablet by mouth daily      OZEMPIC, 0.25 OR 0.5 MG/DOSE, 2 MG/1.5ML SOPN pen Inject 0.25 mg Subcutaneous every 7 days      pen needle, diabetic (BD ULTRA-FINE ISAAC PEN NEEDLE) 32 gauge x 5/32\" Ndle [PEN NEEDLE, DIABETIC (BD ULTRA-FINE ISAAC PEN NEEDLE) 32 GAUGE X 5/32\" NDLE] USE 1 EACH TO TEST TWICE DAILY AS DIRECTED 100 each 0    propranolol (INDERAL) 10 MG tablet Take 10 mg by mouth as needed      rosuvastatin " (CRESTOR) 40 MG tablet Take 1 tablet by mouth At Bedtime      sildenafil (REVATIO) 20 mg tablet [SILDENAFIL (REVATIO) 20 MG TABLET] 1-5 tabs daily as needed 90 tablet 3    tamsulosin (FLOMAX) 0.4 mg cap [TAMSULOSIN (FLOMAX) 0.4 MG CAP] TAKE 1 CAPSULE BY MOUTH DAILY AT BEDTIME 90 capsule 0    No Known Allergies      Lab Results    Chemistry/lipid CBC Cardiac Enzymes/BNP/TSH/INR   Lab Results   Component Value Date    CHOL 199 05/09/2018    HDL 43 05/09/2018    TRIG 168 (H) 05/09/2018    BUN 42 (H) 06/20/2022     06/20/2022    CO2 15 (L) 06/20/2022    Lab Results   Component Value Date    WBC 8.4 06/20/2022    HGB 12.7 (L) 06/20/2022    HCT 38.7 (L) 06/20/2022    MCV 87 06/20/2022     06/20/2022    Lab Results   Component Value Date    TROPONINI 0.01 06/20/2022

## 2024-05-17 ENCOUNTER — PREP FOR PROCEDURE (OUTPATIENT)
Dept: CARDIOLOGY | Facility: CLINIC | Age: 69
End: 2024-05-17
Payer: MEDICARE

## 2024-05-17 DIAGNOSIS — E78.00 PURE HYPERCHOLESTEROLEMIA: ICD-10-CM

## 2024-05-17 DIAGNOSIS — R06.09 DYSPNEA ON EXERTION: Primary | ICD-10-CM

## 2024-05-17 DIAGNOSIS — E11.22 TYPE 2 DIABETES MELLITUS WITH DIABETIC CHRONIC KIDNEY DISEASE, UNSPECIFIED CKD STAGE, UNSPECIFIED WHETHER LONG TERM INSULIN USE (H): ICD-10-CM

## 2024-05-17 DIAGNOSIS — R07.89 CHEST DISCOMFORT: ICD-10-CM

## 2024-05-17 DIAGNOSIS — I25.10 CAD (CORONARY ARTERY DISEASE): ICD-10-CM

## 2024-05-17 RX ORDER — NICOTINE POLACRILEX 4 MG
15-30 LOZENGE BUCCAL
Status: CANCELLED | OUTPATIENT
Start: 2024-05-20

## 2024-05-17 RX ORDER — ASPIRIN 81 MG/1
243 TABLET, CHEWABLE ORAL ONCE
Status: CANCELLED | OUTPATIENT
Start: 2024-05-20

## 2024-05-17 RX ORDER — LIDOCAINE 40 MG/G
CREAM TOPICAL
Status: CANCELLED | OUTPATIENT
Start: 2024-05-17

## 2024-05-17 RX ORDER — ASPIRIN 325 MG
325 TABLET ORAL ONCE
Status: CANCELLED | OUTPATIENT
Start: 2024-05-20 | End: 2024-05-17

## 2024-05-17 RX ORDER — SODIUM CHLORIDE 9 MG/ML
INJECTION, SOLUTION INTRAVENOUS CONTINUOUS
Status: CANCELLED | OUTPATIENT
Start: 2024-05-20

## 2024-05-17 RX ORDER — DEXTROSE MONOHYDRATE 25 G/50ML
25-50 INJECTION, SOLUTION INTRAVENOUS
Status: CANCELLED | OUTPATIENT
Start: 2024-05-20

## 2024-05-17 RX ORDER — FENTANYL CITRATE 50 UG/ML
25 INJECTION, SOLUTION INTRAMUSCULAR; INTRAVENOUS
Status: CANCELLED | OUTPATIENT
Start: 2024-05-20

## 2024-05-17 NOTE — TELEPHONE ENCOUNTER
PC with patient. Education completed. Opportunity to ask questions and have them answered. None further. SHERIN,RN    Monty Mcintyre  [address] [place] [id number]  [phone] (home)     Reason:Dyspnea on exertion, chest discomfort  Procedure cardiologist:  Dr. Menchaca or Dr. Syed  PCP:  Fahad Mack  H&P completed by:  Dr. Mcpherson, 5/16/24  Admit date 5/20/24  Arrival time:  0900  Anticoagulation: None  CPAP: No  Previous PCI: 2017  Bypass Grafts: No  Renal Issues: Yes. Renal protocol   Diabetic?: Yes  Device?: No    Ambulatory?: Independently.      Angiogram Teaching    Reason for Visit:  Telephone call to discuss pre-procedure education in preparation for: Coronary Angiogram, Left Heart Catheterization with possible percutaneous Coronary Angiogram     Procedure Prep:  Primary Cardiologist note dated: 5/16/24  EKG results obtained, dated: Morning of procedure   Pre-procedure labs: Hemogram, BMP and T&S results obtained: Morning of procedure  Lipid Profile results obtained: 5/8/24    Pre-procedure instructions  In preparation for this procedure, we would ask that you have:  No solid food for 8 hours prior to your procedure: 5/20/24 1:00 am  No clear liquids 2 hours prior to your procedure: 5/20/24 0700    Patient instructed to shower the evening before or the morning of the procedure.  Leave all valuables at home (jewlery, rings, watches, large amounts of money).  Patient understands (2) visitors allowed during patients stay.   Patient instructed to arrange for transportation home following procedure from a responsible family member of friend. No driving for at least 24 hours post-procedure.  Patient instructed to have a responsible adult with them for 24 hours post-procedure.  Post-procedure follow up process.  Conscious sedation discussed.    Pre-procedure medication instructions  Patient instructed on antiplatelet medication.  Continue medications as scheduled, with a small amount of water on  "the day of the procedure unless indicated.  Patient instructed to take 325 mg of Aspirin am of procedure: Yes  Other medication: Morning of procedure please HOLD all vitamins, minerals, and supplements. Please TAKE (4) baby aspirin or (1) full size 325 mg aspirin morning of procedure.  No Revatio/Sildenafil for 48 hours prior to procedure.   Morning of procedure,take 1/2 usual long-acting Basaglar insulin = 14 units.  Morning of procedure do NOT TAKE Losartan, Jardiance or short-acting insulin Novolog.       *PATIENTS RECORDS AVAILABLE IN [facility] UNLESS OTHERWISE INDICATED*    Patient Active Problem List   Diagnosis    Pure hypercholesterolemia    Type 2 diabetes mellitus (H)    Abnormal Liver Function Test    Chronic renal insufficiency    Chronic anemia    Benign essential hypertension    Family history of ischemic heart disease       Current Outpatient Medications   Medication Sig Dispense Refill    allopurinol (ZYLOPRIM) 100 MG tablet Take 1 tablet by mouth daily      aspirin 81 MG EC tablet [ASPIRIN 81 MG EC TABLET] Take 81 mg by mouth daily.      BASAGLAR KWIKPEN U-100 INSULIN 100 unit/mL (3 mL) pen [BASAGLAR KWIKPEN U-100 INSULIN 100 UNIT/ML (3 ML) PEN] INJECT 20 UNITS UNDER THE SKIN DAILY (Patient taking differently: Inject 28 Units Subcutaneous daily) 15 pen 0    Continuous Blood Gluc Sensor (FREESTYLE TANIA 14 DAY SENSOR) MISC       insulin aspart (NOVOLOG FLEXPEN) 100 UNIT/ML pen Sliding scale      JARDIANCE 10 MG TABS tablet Take 10 mg by mouth daily      losartan (COZAAR) 25 MG tablet Take 1 tablet by mouth daily      OZEMPIC, 0.25 OR 0.5 MG/DOSE, 2 MG/1.5ML SOPN pen Inject 0.25 mg Subcutaneous every 7 days      pen needle, diabetic (BD ULTRA-FINE ISAAC PEN NEEDLE) 32 gauge x 5/32\" Ndle [PEN NEEDLE, DIABETIC (BD ULTRA-FINE ISAAC PEN NEEDLE) 32 GAUGE X 5/32\" NDLE] USE 1 EACH TO TEST TWICE DAILY AS DIRECTED 100 each 0    propranolol (INDERAL) 10 MG tablet Take 10 mg by mouth as needed      rosuvastatin " (CRESTOR) 40 MG tablet Take 1 tablet by mouth At Bedtime      sildenafil (REVATIO) 20 mg tablet [SILDENAFIL (REVATIO) 20 MG TABLET] 1-5 tabs daily as needed 90 tablet 3    tamsulosin (FLOMAX) 0.4 mg cap [TAMSULOSIN (FLOMAX) 0.4 MG CAP] TAKE 1 CAPSULE BY MOUTH DAILY AT BEDTIME 90 capsule 0       No Known Allergies    Procedure order set placed: 5/17/24    Plan: Patient education completed. Opportunity to ask questions and have them answered. None further at this time. Patient verbalized understanding of responsible adult for 24 hours and  post-procedure at time of discharge. Patient ready for procedure.       Tc Mcgill RN

## 2024-05-17 NOTE — TELEPHONE ENCOUNTER
Emani Campos Christian, RN  Case type: LHC, CORS Poss PCI  Procedure Physician(s): RYAN/ASHLEY  Procedure Date and Patient Arrival Time: Monday 5/20, with arrival time of 0900  H&P: Completed on 5/16 THJ  Pre-Procedure Lab Appt: on admission - Please place lab orders if you haven't already!  Alerts/Important Info: Renal, diabetic  Interpretor Requested: n/a    Thank you,  Emani

## 2024-05-20 ENCOUNTER — HOSPITAL ENCOUNTER (OUTPATIENT)
Facility: HOSPITAL | Age: 69
Discharge: HOME | End: 2024-05-20
Attending: INTERNAL MEDICINE | Admitting: INTERNAL MEDICINE
Payer: MEDICARE

## 2024-05-20 VITALS
SYSTOLIC BLOOD PRESSURE: 150 MMHG | WEIGHT: 214.1 LBS | DIASTOLIC BLOOD PRESSURE: 98 MMHG | HEART RATE: 56 BPM | TEMPERATURE: 97.9 F | BODY MASS INDEX: 33.6 KG/M2 | RESPIRATION RATE: 20 BRPM | HEIGHT: 67 IN | OXYGEN SATURATION: 99 %

## 2024-05-20 DIAGNOSIS — E11.22 TYPE 2 DIABETES MELLITUS WITH CHRONIC KIDNEY DISEASE, WITHOUT LONG-TERM CURRENT USE OF INSULIN, UNSPECIFIED CKD STAGE (H): ICD-10-CM

## 2024-05-20 DIAGNOSIS — I10 BENIGN ESSENTIAL HYPERTENSION: ICD-10-CM

## 2024-05-20 DIAGNOSIS — R07.89 CHEST DISCOMFORT: ICD-10-CM

## 2024-05-20 DIAGNOSIS — Z82.49 FAMILY HISTORY OF ISCHEMIC HEART DISEASE: Primary | ICD-10-CM

## 2024-05-20 DIAGNOSIS — E78.00 PURE HYPERCHOLESTEROLEMIA: ICD-10-CM

## 2024-05-20 DIAGNOSIS — R06.09 DYSPNEA ON EXERTION: ICD-10-CM

## 2024-05-20 DIAGNOSIS — E11.22 TYPE 2 DIABETES MELLITUS WITH DIABETIC CHRONIC KIDNEY DISEASE, UNSPECIFIED CKD STAGE, UNSPECIFIED WHETHER LONG TERM INSULIN USE (H): ICD-10-CM

## 2024-05-20 DIAGNOSIS — I25.10 CAD (CORONARY ARTERY DISEASE): ICD-10-CM

## 2024-05-20 LAB
ABO/RH(D): NORMAL
ANION GAP SERPL CALCULATED.3IONS-SCNC: 10 MMOL/L (ref 7–15)
ANTIBODY SCREEN: NEGATIVE
ATRIAL RATE - MUSE: 54 BPM
BUN SERPL-MCNC: 41 MG/DL (ref 8–23)
CALCIUM SERPL-MCNC: 9.2 MG/DL (ref 8.8–10.2)
CHLORIDE SERPL-SCNC: 108 MMOL/L (ref 98–107)
CREAT SERPL-MCNC: 2.5 MG/DL (ref 0.67–1.17)
DEPRECATED HCO3 PLAS-SCNC: 18 MMOL/L (ref 22–29)
DIASTOLIC BLOOD PRESSURE - MUSE: NORMAL MMHG
EGFRCR SERPLBLD CKD-EPI 2021: 27 ML/MIN/1.73M2
ERYTHROCYTE [DISTWIDTH] IN BLOOD BY AUTOMATED COUNT: 13 % (ref 10–15)
GLUCOSE SERPL-MCNC: 160 MG/DL (ref 70–99)
HCT VFR BLD AUTO: 38.5 % (ref 40–53)
HGB BLD-MCNC: 12.5 G/DL (ref 13.3–17.7)
INTERPRETATION ECG - MUSE: NORMAL
MCH RBC QN AUTO: 28.2 PG (ref 26.5–33)
MCHC RBC AUTO-ENTMCNC: 32.5 G/DL (ref 31.5–36.5)
MCV RBC AUTO: 87 FL (ref 78–100)
P AXIS - MUSE: 27 DEGREES
PLATELET # BLD AUTO: 144 10E3/UL (ref 150–450)
POTASSIUM SERPL-SCNC: 4.9 MMOL/L (ref 3.4–5.3)
PR INTERVAL - MUSE: 186 MS
QRS DURATION - MUSE: 98 MS
QT - MUSE: 406 MS
QTC - MUSE: 385 MS
R AXIS - MUSE: 44 DEGREES
RBC # BLD AUTO: 4.44 10E6/UL (ref 4.4–5.9)
SODIUM SERPL-SCNC: 136 MMOL/L (ref 135–145)
SPECIMEN EXPIRATION DATE: NORMAL
SYSTOLIC BLOOD PRESSURE - MUSE: NORMAL MMHG
T AXIS - MUSE: 65 DEGREES
VENTRICULAR RATE- MUSE: 54 BPM
WBC # BLD AUTO: 6.6 10E3/UL (ref 4–11)

## 2024-05-20 PROCEDURE — 82374 ASSAY BLOOD CARBON DIOXIDE: CPT | Performed by: INTERNAL MEDICINE

## 2024-05-20 PROCEDURE — 999N000099 HC STATISTIC MODERATE SEDATION < 10 MIN: Performed by: INTERNAL MEDICINE

## 2024-05-20 PROCEDURE — 85018 HEMOGLOBIN: CPT | Performed by: INTERNAL MEDICINE

## 2024-05-20 PROCEDURE — 272N000001 HC OR GENERAL SUPPLY STERILE: Performed by: INTERNAL MEDICINE

## 2024-05-20 PROCEDURE — 36415 COLL VENOUS BLD VENIPUNCTURE: CPT | Performed by: INTERNAL MEDICINE

## 2024-05-20 PROCEDURE — 93458 L HRT ARTERY/VENTRICLE ANGIO: CPT | Performed by: INTERNAL MEDICINE

## 2024-05-20 PROCEDURE — 93005 ELECTROCARDIOGRAM TRACING: CPT

## 2024-05-20 PROCEDURE — 999N000054 HC STATISTIC EKG NON-CHARGEABLE

## 2024-05-20 PROCEDURE — 255N000002 HC RX 255 OP 636: Performed by: INTERNAL MEDICINE

## 2024-05-20 PROCEDURE — 93458 L HRT ARTERY/VENTRICLE ANGIO: CPT | Mod: 26 | Performed by: INTERNAL MEDICINE

## 2024-05-20 PROCEDURE — 250N000009 HC RX 250: Performed by: INTERNAL MEDICINE

## 2024-05-20 PROCEDURE — C1769 GUIDE WIRE: HCPCS | Performed by: INTERNAL MEDICINE

## 2024-05-20 PROCEDURE — 258N000003 HC RX IP 258 OP 636: Performed by: INTERNAL MEDICINE

## 2024-05-20 PROCEDURE — 86900 BLOOD TYPING SEROLOGIC ABO: CPT | Performed by: INTERNAL MEDICINE

## 2024-05-20 PROCEDURE — C1887 CATHETER, GUIDING: HCPCS | Performed by: INTERNAL MEDICINE

## 2024-05-20 PROCEDURE — 250N000011 HC RX IP 250 OP 636: Performed by: INTERNAL MEDICINE

## 2024-05-20 PROCEDURE — C1894 INTRO/SHEATH, NON-LASER: HCPCS | Performed by: INTERNAL MEDICINE

## 2024-05-20 PROCEDURE — 93010 ELECTROCARDIOGRAM REPORT: CPT | Mod: HOP | Performed by: STUDENT IN AN ORGANIZED HEALTH CARE EDUCATION/TRAINING PROGRAM

## 2024-05-20 RX ORDER — NICOTINE POLACRILEX 4 MG
15-30 LOZENGE BUCCAL
Status: DISCONTINUED | OUTPATIENT
Start: 2024-05-20 | End: 2024-05-20 | Stop reason: HOSPADM

## 2024-05-20 RX ORDER — FLUMAZENIL 0.1 MG/ML
0.2 INJECTION, SOLUTION INTRAVENOUS
Status: DISCONTINUED | OUTPATIENT
Start: 2024-05-20 | End: 2024-05-20 | Stop reason: HOSPADM

## 2024-05-20 RX ORDER — FENTANYL CITRATE 50 UG/ML
INJECTION, SOLUTION INTRAMUSCULAR; INTRAVENOUS
Status: DISCONTINUED | OUTPATIENT
Start: 2024-05-20 | End: 2024-05-20 | Stop reason: HOSPADM

## 2024-05-20 RX ORDER — OXYCODONE HYDROCHLORIDE 5 MG/1
10 TABLET ORAL EVERY 4 HOURS PRN
Status: DISCONTINUED | OUTPATIENT
Start: 2024-05-20 | End: 2024-05-20 | Stop reason: HOSPADM

## 2024-05-20 RX ORDER — IODIXANOL 320 MG/ML
INJECTION, SOLUTION INTRAVASCULAR
Status: DISCONTINUED | OUTPATIENT
Start: 2024-05-20 | End: 2024-05-20 | Stop reason: HOSPADM

## 2024-05-20 RX ORDER — ASPIRIN 81 MG/1
243 TABLET, CHEWABLE ORAL ONCE
Status: COMPLETED | OUTPATIENT
Start: 2024-05-20 | End: 2024-05-20

## 2024-05-20 RX ORDER — OXYCODONE HYDROCHLORIDE 5 MG/1
5 TABLET ORAL EVERY 4 HOURS PRN
Status: DISCONTINUED | OUTPATIENT
Start: 2024-05-20 | End: 2024-05-20 | Stop reason: HOSPADM

## 2024-05-20 RX ORDER — ACETAMINOPHEN 325 MG/1
650 TABLET ORAL EVERY 4 HOURS PRN
Status: DISCONTINUED | OUTPATIENT
Start: 2024-05-20 | End: 2024-05-20 | Stop reason: HOSPADM

## 2024-05-20 RX ORDER — LIDOCAINE 40 MG/G
CREAM TOPICAL
Status: DISCONTINUED | OUTPATIENT
Start: 2024-05-20 | End: 2024-05-20 | Stop reason: HOSPADM

## 2024-05-20 RX ORDER — ATROPINE SULFATE 0.1 MG/ML
0.5 INJECTION INTRAVENOUS
Status: DISCONTINUED | OUTPATIENT
Start: 2024-05-20 | End: 2024-05-20 | Stop reason: HOSPADM

## 2024-05-20 RX ORDER — FENTANYL CITRATE 50 UG/ML
25 INJECTION, SOLUTION INTRAMUSCULAR; INTRAVENOUS
Status: DISCONTINUED | OUTPATIENT
Start: 2024-05-20 | End: 2024-05-20 | Stop reason: HOSPADM

## 2024-05-20 RX ORDER — SODIUM CHLORIDE 9 MG/ML
INJECTION, SOLUTION INTRAVENOUS CONTINUOUS
Status: DISCONTINUED | OUTPATIENT
Start: 2024-05-20 | End: 2024-05-20 | Stop reason: HOSPADM

## 2024-05-20 RX ORDER — NALOXONE HYDROCHLORIDE 0.4 MG/ML
0.4 INJECTION, SOLUTION INTRAMUSCULAR; INTRAVENOUS; SUBCUTANEOUS
Status: DISCONTINUED | OUTPATIENT
Start: 2024-05-20 | End: 2024-05-20 | Stop reason: HOSPADM

## 2024-05-20 RX ORDER — DEXTROSE MONOHYDRATE 25 G/50ML
25-50 INJECTION, SOLUTION INTRAVENOUS
Status: DISCONTINUED | OUTPATIENT
Start: 2024-05-20 | End: 2024-05-20 | Stop reason: HOSPADM

## 2024-05-20 RX ORDER — NALOXONE HYDROCHLORIDE 0.4 MG/ML
0.2 INJECTION, SOLUTION INTRAMUSCULAR; INTRAVENOUS; SUBCUTANEOUS
Status: DISCONTINUED | OUTPATIENT
Start: 2024-05-20 | End: 2024-05-20 | Stop reason: HOSPADM

## 2024-05-20 RX ORDER — ASPIRIN 325 MG
325 TABLET ORAL ONCE
Status: COMPLETED | OUTPATIENT
Start: 2024-05-20 | End: 2024-05-20

## 2024-05-20 RX ADMIN — SODIUM CHLORIDE: 9 INJECTION, SOLUTION INTRAVENOUS at 11:59

## 2024-05-20 ASSESSMENT — EJECTION FRACTION: EF_VALUE: .27

## 2024-05-20 ASSESSMENT — ACTIVITIES OF DAILY LIVING (ADL)
ADLS_ACUITY_SCORE: 35

## 2024-05-20 NOTE — INTERVAL H&P NOTE
"I have reviewed the surgical (or preoperative) H&P that is linked to this encounter, and examined the patient. There are no significant changes    Clinical Conditions Present on Arrival:  Clinically Significant Risk Factors Present on Admission                 # Drug Induced Platelet Defect: home medication list includes an antiplatelet medication  # Obesity: Estimated body mass index is 33.53 kg/m  as calculated from the following:    Height as of this encounter: 1.702 m (5' 7\").    Weight as of this encounter: 97.1 kg (214 lb 1.6 oz).       "

## 2024-05-20 NOTE — PROGRESS NOTES
Patient was kept comfortable during post-procedure stay.VSS. Denies pain. Right radial access site remains dry & free from signs of bleeding. Tolerated food and fluids. Ambulated without issues. Appointment made & included in AVS. Post-op instructions reviewed and packet given to patient. Able to ask questions. Verbalized no concerns. Belongings returned. Discharged in stable condition. Accompanied by brother.

## 2024-05-20 NOTE — DISCHARGE INSTRUCTIONS
Interventional Cardiology  Coronary Angiogram/Angioplasty/Stent/Atherectomy Discharge  Instructions - Radial (wrist) Approach   The instructions below are to help you understand how to take care of yourself. There is also information about when to call the doctor or emergency services.  **Do not stop your aspirin or platelet inhibitor unless directed by your Cardiologist.  These medications help to prevent platelets in your blood from sticking together and forming a clot.   Examples of these medications are: Ticagrelor (Brilinta), Clopidogrel (Plavix), Prasugrel (Effient)    For 24 hours after procedure:  Do not subject hand/arm to any forceful movements for 24 hours, such as supporting weight when rising from a chair or bed.  Do not drive a car for 24 hours.  The dressing on the puncture site may be removed after 24 hours and left open to air. If minor oozing, you may apply a Band-Aid and remove after 12 hours.   You may shower on the day after your procedure. Do not take a tub bath or wash dishes (no soaking wrist) with the puncture site in water for 3 days after the procedure.    For 48 hours following the procedure:  Do not operate a lawnmower, motorcycle, chainsaw or all-terrain vehicle.  Do not lift anything heavier than 5-10 pounds with affected arm for 5 days.  Avoid excessive bending (flexion/extension) wrist movement.  Do not engage in vigorous exercise (i.e. tennis, golf) using the affected arm for 5 days after discharge.  You may return to work in 72 hours if no complications and no heavy lifting.    If bleeding should occur following discharge:  Sit down and apply firm pressure with your thumb against the puncture site and fingers against back of wrist for 10 minutes.  If the bleeding stops, continue to rest, keeping your wrist still for 2 hours. Notify your doctor as soon as possible.  If bleeding does not stop after 10 minutes or if there is a large amount of bleeding or spurting, call 911  immediately. Do not drive yourself to the hospital.           Contact the [facility] Clinic at [phone] if you develop:  Fever over 100.4, that lasts more than one day  Redness, heat, or pus at the puncture site  Change in color or temperature of the hand or arm    Expect mild tingling of hand and tenderness at the puncture site for up to 3 days. You may take Tylenol or a pain medicine recommended by your doctor.        Your Procedural Physician was: Dr. Menchaca, the phone number is: [phone].  [facility]:  [phone]  If you are calling after hours, please listen to the entire voicemail, a live  will answer at the end of the message

## 2024-05-20 NOTE — PLAN OF CARE
Goal Outcome Evaluation:             Pt admitted for CA/P.PCI due to dyspnea on exertion.  Pt prepped and ready for procedure.       Latricia Cabrera RN

## 2024-05-20 NOTE — PRE-PROCEDURE
GENERAL PRE-PROCEDURE:   Procedure:  Coronary angiogram with possible PCI, left heart catheterization  Date/Time:  5/20/2024 12:48 PM    Written consent obtained?: Yes    Risks and benefits: Risks, benefits and alternatives were discussed    Consent given by:  Patient  Patient states understanding of procedure being performed: Yes    Patient's understanding of procedure matches consent: Yes    Procedure consent matches procedure scheduled: Yes    Expected level of sedation:  Moderate  Appropriately NPO:  Yes  ASA Class:  3 (CAD, HTN, hyperlipidemia, family hx of CAD, CKD Stage IV, DM Type II, Class I obesity; BMI 33.53kg/m2)  Mallampati  :  Grade 3- soft palate visible, posterior pharyngeal wall not visible  Lungs:  Lungs clear with good breath sounds bilaterally  Heart:  Normal heart sounds and rate  History & Physical reviewed:  History and physical reviewed and updates made (see comment)  H&P Comments:  Clinically Significant Risk Factors Present on Admission    Cardiovascular : CAD, HTN, hyperlipidemia, family hx of CAD, DM Type II, Class I obesity; BMI 33.53kg/m2    Fluid & Electrolyte Disorders : Not present on admission    Gastroenterology : Not present on admission    Hematology/Oncology : Chronic anemia; in the setting of advanced CKD; stable    Nephrology : CKD Stage IV; stable, will minimize contrast     Neurology : Not present on admission    Pulmonology : Not present on admission    Systemic : Class I obesity; BMI 33.53kg/m2  Statement of review:  I have reviewed the lab findings, diagnostic data, medications, and the plan for sedation

## 2024-07-21 ENCOUNTER — HEALTH MAINTENANCE LETTER (OUTPATIENT)
Age: 69
End: 2024-07-21

## 2024-09-29 ENCOUNTER — HEALTH MAINTENANCE LETTER (OUTPATIENT)
Age: 69
End: 2024-09-29

## 2024-10-16 ENCOUNTER — TRANSFERRED RECORDS (OUTPATIENT)
Dept: HEALTH INFORMATION MANAGEMENT | Facility: CLINIC | Age: 69
End: 2024-10-16
Payer: MEDICARE

## 2024-10-21 ENCOUNTER — TRANSFERRED RECORDS (OUTPATIENT)
Dept: HEALTH INFORMATION MANAGEMENT | Facility: CLINIC | Age: 69
End: 2024-10-21
Payer: MEDICARE

## 2024-11-18 ENCOUNTER — OFFICE VISIT (OUTPATIENT)
Dept: CARDIOLOGY | Facility: CLINIC | Age: 69
End: 2024-11-18
Payer: MEDICARE

## 2024-11-18 VITALS
BODY MASS INDEX: 31.71 KG/M2 | SYSTOLIC BLOOD PRESSURE: 114 MMHG | WEIGHT: 202 LBS | DIASTOLIC BLOOD PRESSURE: 60 MMHG | RESPIRATION RATE: 12 BRPM | HEART RATE: 60 BPM | HEIGHT: 67 IN

## 2024-11-18 DIAGNOSIS — Z82.49 FAMILY HISTORY OF ISCHEMIC HEART DISEASE: ICD-10-CM

## 2024-11-18 DIAGNOSIS — E78.00 PURE HYPERCHOLESTEROLEMIA: Primary | ICD-10-CM

## 2024-11-18 DIAGNOSIS — I25.10 CORONARY ARTERY DISEASE INVOLVING NATIVE CORONARY ARTERY OF NATIVE HEART WITHOUT ANGINA PECTORIS: ICD-10-CM

## 2024-11-18 DIAGNOSIS — I10 BENIGN ESSENTIAL HYPERTENSION: ICD-10-CM

## 2024-11-18 PROCEDURE — 99214 OFFICE O/P EST MOD 30 MIN: CPT | Performed by: INTERNAL MEDICINE

## 2024-11-18 PROCEDURE — G2211 COMPLEX E/M VISIT ADD ON: HCPCS | Performed by: INTERNAL MEDICINE

## 2024-11-18 RX ORDER — SEMAGLUTIDE 1.34 MG/ML
1 INJECTION, SOLUTION SUBCUTANEOUS
COMMUNITY
Start: 2024-05-16

## 2024-11-18 RX ORDER — LOSARTAN POTASSIUM 50 MG/1
25 TABLET ORAL DAILY
COMMUNITY
Start: 2024-11-15

## 2024-11-18 NOTE — LETTER
11/18/2024    ELISABETH SALCEDO MD  [facility] [address] [facility] [address]    RE: Monty Mcintyre       Dear Colleague,     I had the pleasure of seeing Monty Mcintyre in the [facility].      [facility]  [phone]          Assessment/Recommendations   Patient with multiple risk factors for coronary artery disease including type 2 diabetes, positive family history of premature coronary artery disease, hypertension.  Coronary angiography earlier this year showed nonobstructive disease with the worst stenosis being 30 to 40% narrowing in the mid LAD.  Left ventricular end-diastolic pressure was 14.    Patient feels well.  He exercises hard for 3 minutes in the morning and does 20 push-ups thereafter and I have encouraged him to do a bit more prolonged less vigorous exercise such as 30 minutes of brisk walking at least 5 times a week.  He will take that under advisement.    His LDL cholesterol is well treated.  Blood pressure is well treated.  He takes an antiplatelet agent.    I have not change in his medications today.  His renal function has gradually worsened and there may be a time in the foreseeable future where he would be evaluated for cardiac transplantation.  Currently there no concerns regarding cardiac limitations for renal transplantation.    He does have a very slight systolic murmur and we wanted to evaluate his left ventricular systolic function as well as wall thickness and valve function so we will get an echocardiogram and get back to him with the results and any further recommendations.    Thank you for allowing us to persuade in his care.    The longitudinal plan of care for the diagnosis(es)/condition(s) as documented were addressed during this visit. Due to the added complexity in care, I will continue to support Monty in the subsequent management and with ongoing continuity of care.        History of Present Illness/Subjective    Mr. Millan  "CARA Mcintyre is a 69 year old male with known mild coronary artery disease by angiogram earlier this year.  He also has hypertension, hyperlipidemia and a history of minimal tobacco use.    Patient has been feeling well.  He travels to [place] in [place] each year and spends a couple of months in [place].  He has had no limitations.  He golfed in [place] and walked 8 rounds without difficulty.  He denies unusual shortness of breath with activity and also denies orthopnea, paroxysmal nocturnal dyspnea, he gets trivial peripheral edema.  No palpitations, syncopal or near syncopal episodes and no chest discomfort.       Physical Examination Review of Systems   /60 (BP Location: Right arm, Patient Position: Sitting, Cuff Size: Adult Large)   Pulse 60   Resp 12   Ht 1.702 m (5' 7\")   Wt 91.6 kg (202 lb)   BMI 31.64 kg/m    Body mass index is 31.64 kg/m .  Wt Readings from Last 3 Encounters:   11/18/24 91.6 kg (202 lb)   05/20/24 97.1 kg (214 lb 1.6 oz)   05/16/24 97.1 kg (214 lb 1.6 oz)     General Appearance:   Alert, cooperative and in no acute distress.   ENT/Mouth: Pink/moist oral mucosa   EYES:  no scleral icterus, normal conjunctivae   Neck: JVP normal. No Hepatojugular reflux. Thyroid not visualized.   Chest/Lungs:   Lungs are clear to auscultation, equal chest wall expansion.   Cardiovascular:   S1, S2 with 1/6 systolic murmur , no clicks or rubs. Brachial, radial and posterior tibial pulses are intact and symetric. No carotid bruits noted   Abdomen:  Nontender.    Extremities: No cyanosis, clubbing or edema   Skin: no xanthelasma, warm.    Neurologic: normal arm movement bilateral, no tremors     Psychiatric: Appropriate affect.      Encounter Vitals  BP: 114/60  Pulse: 60  Resp: 12  Weight: 91.6 kg (202 lb)  Height: 170.2 cm (5' 7\")                                           Medical History  Surgical History Family History Social History   Past Medical History:   Diagnosis Date     Diabetes mellitus, type 2 " (H)      High cholesterol     Past Surgical History:   Procedure Laterality Date     CARDIAC CATHETERIZATION  2017     CARDIAC CATHETERIZATION N/A 2017    Procedure: Coronary Angiogram;  Surgeon: Paty Johnson MD;  Location: [facility] Cath Lab;  Service:      CV CORONARY ANGIOGRAM N/A 2024    Procedure: Coronary Angiogram;  Surgeon: Jake Syed MD;  Location: [facility] CATH LAB CV     CV LEFT HEART CATH N/A 2024    Procedure: Left Heart Catheterization;  Surgeon: Jake Syed MD;  Location: [facility] CATH LAB CV     LA CATH PLMT L HRT & ARTS W/NJX & ANGIO IMG S&I Left 2017    Procedure: Left Heart Catheterization Without Left Ventriculogram;  Surgeon: Paty Johnson MD;  Location: [facility] Cath Lab;  Service: Cardiology     TOTAL KNEE ARTHROPLASTY Left 13    Family History   Problem Relation Age of Onset     Coronary Artery Disease Other      Prostate Cancer Other      Diabetes Other      Cancer Brother      Heart Failure Brother      Multiple Sclerosis Sister      Coronary Artery Disease Father     Social History     Socioeconomic History     Marital status: Single     Spouse name: Not on file     Number of children: Not on file     Years of education: Not on file     Highest education level: Not on file   Occupational History     Not on file   Tobacco Use     Smoking status: Former     Current packs/day: 0.00     Types: Cigarettes     Quit date: 2012     Years since quittin.3     Smokeless tobacco: Never   Vaping Use     Vaping status: Never Used   Substance and Sexual Activity     Alcohol use: Yes     Drug use: No     Sexual activity: Not on file   Other Topics Concern     Not on file   Social History Narrative     Not on file     Social Drivers of Health     Financial Resource Strain: Not on file   Food Insecurity: Not on file   Transportation Needs: Not on file   Physical Activity: Not on file   Stress: Not on file   Social Connections: Not on file  "  Interpersonal Safety: Not on file   Housing Stability: Not on file          Medications  Allergies   Current Outpatient Medications   Medication Sig Dispense Refill     allopurinol (ZYLOPRIM) 100 MG tablet Take 1 tablet by mouth daily       aspirin 81 MG EC tablet [ASPIRIN 81 MG EC TABLET] Take 81 mg by mouth daily.       BASAGLAR KWIKPEN U-100 INSULIN 100 unit/mL (3 mL) pen [BASAGLAR KWIKPEN U-100 INSULIN 100 UNIT/ML (3 ML) PEN] INJECT 20 UNITS UNDER THE SKIN DAILY (Patient taking differently: Inject 28 Units subcutaneously daily.) 15 pen 0     Continuous Blood Gluc Sensor (FREESTYLE TANIA 14 DAY SENSOR) MISC        insulin aspart (NOVOLOG FLEXPEN) 100 UNIT/ML pen Sliding scale       JARDIANCE 10 MG TABS tablet Take 10 mg by mouth daily       losartan (COZAAR) 50 MG tablet Take 25 mg by mouth daily.       OZEMPIC, 1 MG/DOSE, 4 MG/3ML pen Inject 1 mg subcutaneously every 7 days.       rosuvastatin (CRESTOR) 40 MG tablet Take 1 tablet by mouth At Bedtime       sildenafil (REVATIO) 20 mg tablet [SILDENAFIL (REVATIO) 20 MG TABLET] 1-5 tabs daily as needed 90 tablet 3     tamsulosin (FLOMAX) 0.4 mg cap [TAMSULOSIN (FLOMAX) 0.4 MG CAP] TAKE 1 CAPSULE BY MOUTH DAILY AT BEDTIME 90 capsule 0     pen needle, diabetic (BD ULTRA-FINE ISAAC PEN NEEDLE) 32 gauge x 5/32\" Ndle [PEN NEEDLE, DIABETIC (BD ULTRA-FINE ISAAC PEN NEEDLE) 32 GAUGE X 5/32\" NDLE] USE 1 EACH TO TEST TWICE DAILY AS DIRECTED 100 each 0    No Known Allergies      Lab Results    Chemistry/lipid CBC Cardiac Enzymes/BNP/TSH/INR   Lab Results   Component Value Date    CHOL 199 05/09/2018    HDL 43 05/09/2018    TRIG 208 (A) 05/08/2024    BUN 41.0 (H) 05/20/2024     05/20/2024    CO2 18 (L) 05/20/2024    Lab Results   Component Value Date    WBC 6.6 05/20/2024    HGB 12.5 (L) 05/20/2024    HCT 38.5 (L) 05/20/2024    MCV 87 05/20/2024     (L) 05/20/2024    Lab Results   Component Value Date    TROPONINI 0.01 06/20/2022                                     "            Thank you for allowing me to participate in the care of your patient.      Sincerely,     Saroj Mcpherson MD     [facility]  cc:   Jake Syed MD  [address]  [place] [address]

## 2024-11-18 NOTE — PROGRESS NOTES
[facility]  [phone]          Assessment/Recommendations   Patient with multiple risk factors for coronary artery disease including type 2 diabetes, positive family history of premature coronary artery disease, hypertension.  Coronary angiography earlier this year showed nonobstructive disease with the worst stenosis being 30 to 40% narrowing in the mid LAD.  Left ventricular end-diastolic pressure was 14.    Patient feels well.  He exercises hard for 3 minutes in the morning and does 20 push-ups thereafter and I have encouraged him to do a bit more prolonged less vigorous exercise such as 30 minutes of brisk walking at least 5 times a week.  He will take that under advisement.    His LDL cholesterol is well treated.  Blood pressure is well treated.  He takes an antiplatelet agent.    I have not change in his medications today.  His renal function has gradually worsened and there may be a time in the foreseeable future where he would be evaluated for cardiac transplantation.  Currently there no concerns regarding cardiac limitations for renal transplantation.    He does have a very slight systolic murmur and we wanted to evaluate his left ventricular systolic function as well as wall thickness and valve function so we will get an echocardiogram and get back to him with the results and any further recommendations.    Thank you for allowing us to persuade in his care.    The longitudinal plan of care for the diagnosis(es)/condition(s) as documented were addressed during this visit. Due to the added complexity in care, I will continue to support Monty in the subsequent management and with ongoing continuity of care.        History of Present Illness/Subjective    Mr. Monty Mcintyre is a 69 year old male with known mild coronary artery disease by angiogram earlier this year.  He also has hypertension, hyperlipidemia and a history of minimal tobacco use.    Patient has been feeling well.  He  "travels to [place] in [place] each year and spends a couple of months in [place].  He has had no limitations.  He golfed in [place] and walked 8 rounds without difficulty.  He denies unusual shortness of breath with activity and also denies orthopnea, paroxysmal nocturnal dyspnea, he gets trivial peripheral edema.  No palpitations, syncopal or near syncopal episodes and no chest discomfort.       Physical Examination Review of Systems   /60 (BP Location: Right arm, Patient Position: Sitting, Cuff Size: Adult Large)   Pulse 60   Resp 12   Ht 1.702 m (5' 7\")   Wt 91.6 kg (202 lb)   BMI 31.64 kg/m    Body mass index is 31.64 kg/m .  Wt Readings from Last 3 Encounters:   11/18/24 91.6 kg (202 lb)   05/20/24 97.1 kg (214 lb 1.6 oz)   05/16/24 97.1 kg (214 lb 1.6 oz)     General Appearance:   Alert, cooperative and in no acute distress.   ENT/Mouth: Pink/moist oral mucosa   EYES:  no scleral icterus, normal conjunctivae   Neck: JVP normal. No Hepatojugular reflux. Thyroid not visualized.   Chest/Lungs:   Lungs are clear to auscultation, equal chest wall expansion.   Cardiovascular:   S1, S2 with 1/6 systolic murmur , no clicks or rubs. Brachial, radial and posterior tibial pulses are intact and symetric. No carotid bruits noted   Abdomen:  Nontender.    Extremities: No cyanosis, clubbing or edema   Skin: no xanthelasma, warm.    Neurologic: normal arm movement bilateral, no tremors     Psychiatric: Appropriate affect.      Encounter Vitals  BP: 114/60  Pulse: 60  Resp: 12  Weight: 91.6 kg (202 lb)  Height: 170.2 cm (5' 7\")                                           Medical History  Surgical History Family History Social History   Past Medical History:   Diagnosis Date    Diabetes mellitus, type 2 (H)     High cholesterol     Past Surgical History:   Procedure Laterality Date    CARDIAC CATHETERIZATION  05/22/2017    CARDIAC CATHETERIZATION N/A 5/22/2017    Procedure: Coronary Angiogram;  Surgeon: Paty Johnson MD; "  Location: [facility] Cath Lab;  Service:     CV CORONARY ANGIOGRAM N/A 2024    Procedure: Coronary Angiogram;  Surgeon: Jake Syed MD;  Location: [facility] CATH LAB CV    CV LEFT HEART CATH N/A 2024    Procedure: Left Heart Catheterization;  Surgeon: Jake Syed MD;  Location: [facility] CATH LAB CV    MS CATH PLMT L HRT & ARTS W/NJX & ANGIO IMG S&I Left 2017    Procedure: Left Heart Catheterization Without Left Ventriculogram;  Surgeon: Paty Johnson MD;  Location: [facility] Cath Lab;  Service: Cardiology    TOTAL KNEE ARTHROPLASTY Left 13    Family History   Problem Relation Age of Onset    Coronary Artery Disease Other     Prostate Cancer Other     Diabetes Other     Cancer Brother     Heart Failure Brother     Multiple Sclerosis Sister     Coronary Artery Disease Father     Social History     Socioeconomic History    Marital status: Single     Spouse name: Not on file    Number of children: Not on file    Years of education: Not on file    Highest education level: Not on file   Occupational History    Not on file   Tobacco Use    Smoking status: Former     Current packs/day: 0.00     Types: Cigarettes     Quit date: 2012     Years since quittin.3    Smokeless tobacco: Never   Vaping Use    Vaping status: Never Used   Substance and Sexual Activity    Alcohol use: Yes    Drug use: No    Sexual activity: Not on file   Other Topics Concern    Not on file   Social History Narrative    Not on file     Social Drivers of Health     Financial Resource Strain: Not on file   Food Insecurity: Not on file   Transportation Needs: Not on file   Physical Activity: Not on file   Stress: Not on file   Social Connections: Not on file   Interpersonal Safety: Not on file   Housing Stability: Not on file          Medications  Allergies   Current Outpatient Medications   Medication Sig Dispense Refill    allopurinol (ZYLOPRIM) 100 MG tablet Take 1 tablet by mouth daily      aspirin 81 MG  "EC tablet [ASPIRIN 81 MG EC TABLET] Take 81 mg by mouth daily.      BASAGLAR KWIKPEN U-100 INSULIN 100 unit/mL (3 mL) pen [BASAGLAR KWIKPEN U-100 INSULIN 100 UNIT/ML (3 ML) PEN] INJECT 20 UNITS UNDER THE SKIN DAILY (Patient taking differently: Inject 28 Units subcutaneously daily.) 15 pen 0    Continuous Blood Gluc Sensor (FREESTYLE TANIA 14 DAY SENSOR) MISC       insulin aspart (NOVOLOG FLEXPEN) 100 UNIT/ML pen Sliding scale      JARDIANCE 10 MG TABS tablet Take 10 mg by mouth daily      losartan (COZAAR) 50 MG tablet Take 25 mg by mouth daily.      OZEMPIC, 1 MG/DOSE, 4 MG/3ML pen Inject 1 mg subcutaneously every 7 days.      rosuvastatin (CRESTOR) 40 MG tablet Take 1 tablet by mouth At Bedtime      sildenafil (REVATIO) 20 mg tablet [SILDENAFIL (REVATIO) 20 MG TABLET] 1-5 tabs daily as needed 90 tablet 3    tamsulosin (FLOMAX) 0.4 mg cap [TAMSULOSIN (FLOMAX) 0.4 MG CAP] TAKE 1 CAPSULE BY MOUTH DAILY AT BEDTIME 90 capsule 0    pen needle, diabetic (BD ULTRA-FINE ISAAC PEN NEEDLE) 32 gauge x 5/32\" Ndle [PEN NEEDLE, DIABETIC (BD ULTRA-FINE ISAAC PEN NEEDLE) 32 GAUGE X 5/32\" NDLE] USE 1 EACH TO TEST TWICE DAILY AS DIRECTED 100 each 0    No Known Allergies      Lab Results    Chemistry/lipid CBC Cardiac Enzymes/BNP/TSH/INR   Lab Results   Component Value Date    CHOL 199 05/09/2018    HDL 43 05/09/2018    TRIG 208 (A) 05/08/2024    BUN 41.0 (H) 05/20/2024     05/20/2024    CO2 18 (L) 05/20/2024    Lab Results   Component Value Date    WBC 6.6 05/20/2024    HGB 12.5 (L) 05/20/2024    HCT 38.5 (L) 05/20/2024    MCV 87 05/20/2024     (L) 05/20/2024    Lab Results   Component Value Date    TROPONINI 0.01 06/20/2022                                            "

## 2024-11-19 ENCOUNTER — HOSPITAL ENCOUNTER (OUTPATIENT)
Dept: CARDIOLOGY | Facility: CLINIC | Age: 69
Discharge: HOME | End: 2024-11-19
Attending: INTERNAL MEDICINE
Payer: MEDICARE

## 2024-11-19 DIAGNOSIS — Z82.49 FAMILY HISTORY OF ISCHEMIC HEART DISEASE: ICD-10-CM

## 2024-11-19 DIAGNOSIS — I25.10 CORONARY ARTERY DISEASE INVOLVING NATIVE CORONARY ARTERY OF NATIVE HEART WITHOUT ANGINA PECTORIS: ICD-10-CM

## 2024-11-19 DIAGNOSIS — E78.00 PURE HYPERCHOLESTEROLEMIA: ICD-10-CM

## 2024-11-19 DIAGNOSIS — I10 BENIGN ESSENTIAL HYPERTENSION: ICD-10-CM

## 2024-11-19 LAB — LVEF ECHO: NORMAL

## 2024-11-19 PROCEDURE — 255N000002 HC RX 255 OP 636: Performed by: INTERNAL MEDICINE

## 2024-11-19 PROCEDURE — 93306 TTE W/DOPPLER COMPLETE: CPT | Mod: 26 | Performed by: INTERNAL MEDICINE

## 2024-11-19 PROCEDURE — 999N000208 ECHOCARDIOGRAM COMPLETE

## 2024-11-19 PROCEDURE — C8929 TTE W OR WO FOL WCON,DOPPLER: HCPCS

## 2024-11-19 RX ADMIN — PERFLUTREN 2 ML: 6.52 INJECTION, SUSPENSION INTRAVENOUS at 09:35

## 2024-12-08 ENCOUNTER — HEALTH MAINTENANCE LETTER (OUTPATIENT)
Age: 69
End: 2024-12-08

## 2025-06-29 ENCOUNTER — HEALTH MAINTENANCE LETTER (OUTPATIENT)
Age: 70
End: 2025-06-29

## (undated) DEVICE — CUSTOM PACK CORONARY SAN5BCRHEA

## (undated) DEVICE — MANIFOLD KIT ANGIO AUTOMATED 014613

## (undated) DEVICE — KIT HAND CONTROL ACIST 014644 AR-P54

## (undated) DEVICE — SLEEVE TR BAND RADIAL COMPRESSION DEVICE 24CM TRB24-REG

## (undated) DEVICE — SHTH INTRO 0.021IN ID 6FR DIA

## (undated) DEVICE — SYR ANGIOGRAPHY MULTIUSE KIT ACIST 014612

## (undated) DEVICE — EXCHANGE WIRE .035 260 STAR/JFC/035/260/ M001491681

## (undated) DEVICE — ELECTRODE DEFIB CADENCE 22550R

## (undated) DEVICE — CATH DIAGNOSTIC RADIAL 5FR TIG 4.0

## (undated) RX ORDER — FENTANYL CITRATE 50 UG/ML
INJECTION, SOLUTION INTRAMUSCULAR; INTRAVENOUS
Status: DISPENSED
Start: 2024-05-20